# Patient Record
Sex: FEMALE | Race: WHITE | NOT HISPANIC OR LATINO | Employment: OTHER | URBAN - NONMETROPOLITAN AREA
[De-identification: names, ages, dates, MRNs, and addresses within clinical notes are randomized per-mention and may not be internally consistent; named-entity substitution may affect disease eponyms.]

---

## 2017-03-06 DIAGNOSIS — E07.9 THYROID DISEASE: ICD-10-CM

## 2017-03-06 RX ORDER — LEVOTHYROXINE SODIUM 112 MCG
112 TABLET ORAL DAILY
Qty: 90 TABLET | Refills: 0 | Status: SHIPPED | OUTPATIENT
Start: 2017-03-06

## 2017-03-06 NOTE — TELEPHONE ENCOUNTER
Synthroid     Last Written Prescription Date: 3/3/16  Last Quantity: 90, # refills: 3  Last Office Visit with G, [facility] or [facility] prescribing provider: 8/29/16   Next 5 appointments (look out 90 days)     Mar 15, 2017 11:00 AM CDT   (Arrive by 10:45 AM)   SHORT with Jia Pina, NP   [facility] ([facility] )    [address]  [facility] [address]   [phone]                   TSH   Date Value Ref Range Status   08/29/2016 1.68 0.40 - 4.00 mU/L Final

## 2017-03-16 ENCOUNTER — HOSPITAL ENCOUNTER (EMERGENCY)
Facility: HOSPITAL | Age: 72
Discharge: HOME | End: 2017-03-16
Attending: FAMILY MEDICINE | Admitting: FAMILY MEDICINE
Payer: MEDICARE

## 2017-03-16 VITALS
OXYGEN SATURATION: 96 % | TEMPERATURE: 99.1 F | RESPIRATION RATE: 18 BRPM | HEART RATE: 84 BPM | DIASTOLIC BLOOD PRESSURE: 82 MMHG | SYSTOLIC BLOOD PRESSURE: 163 MMHG
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
## 2017-03-16 PROCEDURE — 99282 EMERGENCY DEPT VISIT SF MDM: CPT

## 2017-03-16 PROCEDURE — 99283 EMERGENCY DEPT VISIT LOW MDM: CPT | Performed by: FAMILY MEDICINE

## 2017-03-16 NOTE — DISCHARGE INSTRUCTIONS
Preventing Falls: How to Prepare and What to Do  Falling is not something you want to think about. But it can make a big difference to plan ahead. If you're prepared, you'll know how to get help. And you'll be less likely to panic if you fall. This means you'll be able to do what's needed to get help right away.    How to prepare    Have someone check on you daily.    Keep a list of emergency numbers near the phone.    Always have a way to call for help. Keep a cell phone with you at all times. Or talk with your healthcare provider about how to set up a home monitoring service. This involves wearing a small device around your neck or wrist. If you fall, you can press the button on the device. This alerts emergency responders.    Talk with your healthcare provider about an exercise program that's right for you. Regular exercise may reduce the risk of falling and the risk for injury related to a fall.    It's important to have good lighting in your home. Avoid using throw rugs, because they can raise your risk of tripping and falling. Add grab bars in the bathroom to help reduce the risk of falling. Small changes can make your home safer. Talk with your healthcare provider about making your home safer.  What to do if you fall  Above all, try to stay calm:    If you start to fall, try to relax your body. This will reduce the impact of the fall.    After you fall, press your monitor button, or phone for help.    Don't rush to get up. First, make sure you're not hurt.    Roll onto your side, then crawl to a chair. Pull yourself up onto the chair slowly.    You should be checked if you struck your head, lost consciousness, were confused afterward, or have any other concerns for injury.    Be sure to tell your doctor that you fell.  A note to family and friends  If you're with a loved one when he or she starts to fall, don't try to stop the fall. Ease the person to the floor carefully, so neither of you gets hurt. Don't  leave the person alone. And don't try to move him or her. Put a pillow under his or her head. Check for injuries. If help is needed right away, be sure to call 911.     2904-0840 [employer]. [address] [place] [address], [place] [address]. All rights reserved. This information is not intended as a substitute for professional medical care. Always follow your healthcare professional's instructions.

## 2017-03-16 NOTE — ED AVS SNAPSHOT
[facility]    [address]    [place] [address]    Phone:  [phone]                                       Na Vogel   MRN: [id number]    Department:  [facility]   Date of Visit:  3/16/2017           Patient Information     Date Of Birth          1945        Your diagnoses for this visit were:     Fall, initial encounter        You were seen by Lizy Dasilva MD.        Discharge Instructions         Preventing Falls: How to Prepare and What to Do  Falling is not something you want to think about. But it can make a big difference to plan ahead. If you're prepared, you'll know how to get help. And you'll be less likely to panic if you fall. This means you'll be able to do what's needed to get help right away.    How to prepare    Have someone check on you daily.    Keep a list of emergency numbers near the phone.    Always have a way to call for help. Keep a cell phone with you at all times. Or talk with your healthcare provider about how to set up a home monitoring service. This involves wearing a small device around your neck or wrist. If you fall, you can press the button on the device. This alerts emergency responders.    Talk with your healthcare provider about an exercise program that's right for you. Regular exercise may reduce the risk of falling and the risk for injury related to a fall.    It's important to have good lighting in your home. Avoid using throw rugs, because they can raise your risk of tripping and falling. Add grab bars in the bathroom to help reduce the risk of falling. Small changes can make your home safer. Talk with your healthcare provider about making your home safer.  What to do if you fall  Above all, try to stay calm:    If you start to fall, try to relax your body. This will reduce the impact of the fall.    After you fall, press your monitor button, or phone for help.    Don't rush to get up. First, make sure you're not  hurt.    Roll onto your side, then crawl to a chair. Pull yourself up onto the chair slowly.    You should be checked if you struck your head, lost consciousness, were confused afterward, or have any other concerns for injury.    Be sure to tell your doctor that you fell.  A note to family and friends  If you're with a loved one when he or she starts to fall, don't try to stop the fall. Ease the person to the floor carefully, so neither of you gets hurt. Don't leave the person alone. And don't try to move him or her. Put a pillow under his or her head. Check for injuries. If help is needed right away, be sure to call 911.     5436-5245 [employer]. [address] [place], [place] [address]. All rights reserved. This information is not intended as a substitute for professional medical care. Always follow your healthcare professional's instructions.             Review of your medicines      Our records show that you are taking the medicines listed below. If these are incorrect, please call your family doctor or clinic.        Dose / Directions Last dose taken    albuterol 108 (90 BASE) MCG/ACT Inhaler   Commonly known as:  albuterol   Quantity:  3 Inhaler        INHALE 2 PUFFS INTO THE LUNGS EVERY 6 HOURS   Refills:  3        budesonide-formoterol 160-4.5 MCG/ACT Inhaler   Commonly known as:  SYMBICORT   Dose:  2 puff   Quantity:  3 Inhaler        Inhale 2 puffs into the lungs 2 times daily   Refills:  3        cetirizine 10 MG tablet   Commonly known as:  zyrTEC   Dose:  10 mg   Quantity:  90 tablet        Take 1 tablet (10 mg) by mouth every evening   Refills:  3        citalopram 20 MG tablet   Commonly known as:  celeXA   Quantity:  90 tablet        TAKE 1 TABLET BY MOUTH EVERY DAY   Refills:  0        esomeprazole 40 MG CR capsule   Commonly known as:  nexIUM   Quantity:  90 capsule        TAKE ONE CAPSULE BY MOUTH EVERY DAY   Refills:  0        fluticasone 50 MCG/ACT spray   Commonly known as:   "FLONASE   Dose:  1-2 spray   Quantity:  16 g        Spray 1-2 sprays into both nostrils daily   Refills:  1        ipratropium - albuterol 0.5 mg/2.5 mg/3 mL 0.5-2.5 (3) MG/3ML neb solution   Commonly known as:  DUONEB   Quantity:  1080 mL        NEBULIZE 1 VIAL EVERY 6 HOURS AS NEEDED FOR SHORTNESS OF BREATH, DYSPNEA, OR WHEEZING   Refills:  5        montelukast 10 MG tablet   Commonly known as:  SINGULAIR   Quantity:  90 tablet        TAKE 1 TABLET BY MOUTH EVERY NIGHT AT BEDTIME   Refills:  1        order for DME   Quantity:  1 Device        Equipment being ordered: Nebulizer   Refills:  0        SYNTHROID 112 MCG tablet   Dose:  112 mcg   Quantity:  90 tablet   Generic drug:  levothyroxine        Take 1 tablet (112 mcg) by mouth daily   Refills:  0                Orders Needing Specimen Collection     None      Pending Results     No orders found from 3/14/2017 to 3/17/2017.            Pending Culture Results     No orders found from 3/14/2017 to 3/17/2017.            Thank you for choosing [facility]       Thank you for choosing [facility] for your care. Our goal is always to provide you with excellent care. Hearing back from our patients is one way we can continue to improve our services. Please take a few minutes to complete the written survey that you may receive in the mail after you visit with us. Thank you!        [employer] Information     [employer] lets you send messages to your doctor, view your test results, renew your prescriptions, schedule appointments and more. To sign up, go to [email or web address] . Click on \"Log in\" on the left side of the screen, which will take you to the Welcome page. Then click on \"Sign up Now\" on the right side of the page.     You will be asked to enter the access code listed below, as well as some personal information. Please follow the directions to create your username and password.     Your access code is: [email or web address]  Expires: 6/14/2017  5:50 PM     Your access code will "  in 90 days. If you need help or a new code, please call your [place] clinic or [phone].        Care EveryWhere ID     This is your Care EveryWhere ID. This could be used by other organizations to access your [place] medical records  [id number]        After Visit Summary       This is your record. Keep this with you and show to your community pharmacist(s) and doctor(s) at your next visit.

## 2017-03-16 NOTE — ED AVS SNAPSHOT
[facility]    [address] [place] [address]    Phone:  [phone]                                       Na Vogel   MRN: [id number]    Department:  [facility]   Date of Visit:  3/16/2017           After Visit Summary Signature Page     I have received my discharge instructions, and my questions have been answered. I have discussed any challenges I see with this plan with the nurse or doctor.    ..........................................................................................................................................  Patient/Patient Representative Signature      ..........................................................................................................................................  Patient Representative Print Name and Relationship to Patient    ..................................................               ................................................  Date                                            Time    ..........................................................................................................................................  Reviewed by Signature/Title    ...................................................              ..............................................  Date                                                            Time

## 2017-03-16 NOTE — ED NOTES
"Pt stated she fell (I tripped over a drawer I was cleaning out) yesterday in the Kitchen around 1430 \"and hit my head on the cupboard and landed on my left side\". Pt states no LOC, \"I had a bump on my head but I put ice on it\". Pt c/o right neck pain \"more sore today\".    Pt stated she went to [facility] today because she was having ear, neck and throat pain. Stated they checked her out for strep and flu and those results were negative and was told she had a sinus infection and was given \"a shot for that\" at the clinic.    Stated \"I forgot to tell them about my fall\". Pt stated she woke up today feeling sick. Her kids are worried she has a concussion and that is why she feels ill. \"I wouldn't have come to ER but they insisted I come and have my head checked\".  "

## 2017-03-18 NOTE — ED PROVIDER NOTES
"eMERGENCY dEPARTMENT eNCOUnter        CHIEF COMPLAINT    Chief Complaint   Patient presents with     Head Injury       HPI    Na Vogel is a 71 year old female who presents to be evaluated for a concussion after she fell yesterday and bumped her head on her cupboard. She was cleaning under her oven, she'd left an adjacent drawer open, when she stood up she tripped over the drawer.  She did not lose consciousness. She \"laid there\" for a minute,   She got up, sat on her couch.  Then felt fine. That night she developed a runny nose and some sinus symptoms, she went into [facility] clinic this morning, was given antibiotic for sinus issues.  She states she was given a shot.  When she got home, her daughter called and was \"furious\" that she didn't get evaluated for a concussion.  She didn't have any LOC, no nausea, no vomiting. She is planning to drive to [place] tomorrow and wanted to get checked out.    REVIEW OF SYSTEMS    Neurologic: No extremity pain or weakenss  Cardiac: No Chest Pain or Chest Injury  Respiratory: No Difficulty breathing  GI: No Abdominal pain or Abdominal Injury  : No Dysuria or Hematuria  General: No Fever  All other systems reviewed and are negative.     PAST MEDICAL & SURGICAL HISTORY    Past Medical History   Diagnosis Date     Acute pharyngitis 02/24/2000     Acute sinusitis, unspecified 11/10/2000     Acute upper respiratory infections of unspecified 09/29/2005     Bronchitis, not specified as acute or chronic 01/07/2002     Cholecystitis, unspecified 03/06/2001     Dizziness and giddiness 10/29/2001     Dysuria 02/17/2005     Esophageal reflux 11/26/2002     Follow-up examination, following other surgery 08/01/2002     Follow-up examination, following unspecified surge 03/22/2001     Gastro-oesophageal reflux disease      Giant cell arteritis (H) 06/28/2000     Hiatal hernia      Midline low back pain without sciatica 8/29/2016     Mild persistent asthma 1/13/2014 "     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     Myalgia and myositis, unspecified 06/28//2000     Need for prophylactic vaccination and inoculation,Need for prophylactic vaccination and inoculation, 10/22/2004     Osteoarthrosis, unspecified whether generalized or 06/18/2002     Other abnormal findings on radiological examinatio 04/19/2002     Other and unspecified hyperlipidemia 01/23/2001     Other malaise and fatigue 11/12/2004     Other screening mammogram 11/27/2002     Other specified pre-operative examination 07/20/2005     Pain in joint involving lower leg 06/07/2002     Pain in joint, site unspecified 03/02/2000     Pure hypercholesterolemia 07/22/2003     Rosacea 8/29/2013     Routine general medical examination at [facility] 04/15/2002     Thyroid disease      Unspecified acquired hypothyroidism 11/22/2002     Unspecified hypertrophic and atrophic conditions o 11/09/1999     Unspecified otitis media 09/25/2007     Unspecified sinusitis (chronic) 11/04/1999     Past Surgical History   Procedure Laterality Date     Orthopedic surgery       Gyn surgery       Cholecystectomy       Appendectomy       Ligate vein varicose, phlebectomy multiple stab, combined  4/16/2014     Procedure: BILATERAL ANTERIOR AND POSTERIOR STAB PHLEBECTOMY, EXCISION SOFT TISSUE MASS RIGHT LOWER BUTTOCK;  Surgeon: Lara Galeano DO;  Location: [facility]     Colonoscopy  07-     Repeat 10 years     Colonoscopy  2012       CURRENT MEDICATIONS    Current Outpatient Rx   Medication Sig Dispense Refill     SYNTHROID 112 MCG tablet Take 1 tablet (112 mcg) by mouth daily 90 tablet 0     citalopram (CELEXA) 20 MG tablet TAKE 1 TABLET BY MOUTH EVERY DAY 90 tablet 0     esomeprazole (NEXIUM) 40 MG CR capsule TAKE ONE CAPSULE BY MOUTH EVERY DAY 90 capsule 0     budesonide-formoterol (SYMBICORT) 160-4.5 MCG/ACT inhaler Inhale 2 puffs into the lungs 2 times daily 3 Inhaler 3     cetirizine (ZYRTEC) 10 MG tablet Take 1 tablet (10 mg) by  mouth every evening 90 tablet 3     montelukast (SINGULAIR) 10 MG tablet TAKE 1 TABLET BY MOUTH EVERY NIGHT AT BEDTIME 90 tablet 1     albuterol (ALBUTEROL) 108 (90 BASE) MCG/ACT inhaler INHALE 2 PUFFS INTO THE LUNGS EVERY 6 HOURS 3 Inhaler 3     ipratropium - albuterol 0.5 mg/2.5 mg/3 mL (DUONEB) 0.5-2.5 (3) MG/3ML nebulization NEBULIZE 1 VIAL EVERY 6 HOURS AS NEEDED FOR SHORTNESS OF BREATH, DYSPNEA, OR WHEEZING 1080 mL 5     fluticasone (FLONASE) 50 MCG/ACT nasal spray Spray 1-2 sprays into both nostrils daily 16 g 1     ORDER FOR DME Equipment being ordered: Nebulizer 1 Device 0       ALLERGIES    Allergies   Allergen Reactions     Aspirin Other (See Comments)     abd pain  cramps     Moxifloxacin Hcl [Avelox] Hives     Ibuprofen Sodium Rash       SOCIAL & FAMILY HISTORY    Social History     Social History     Marital status:      Spouse name: N/A     Number of children: N/A     Years of education: N/A     Social History Main Topics     Smoking status: Never Smoker     Smokeless tobacco: Never Used     Alcohol use No     Drug use: No     Sexual activity: Not on file     Other Topics Concern     Parent/Sibling W/ Cabg, Mi Or Angioplasty Before 65f 55m? No     Social History Narrative     Family History   Problem Relation Age of Onset     Thyroid Disease Mother      Prostate Cancer Father      CANCER Father      cancer of bone       PHYSICAL EXAM    VITAL SIGNS: /82  Pulse 84  Temp 99.1  F (37.3  C) (Oral)  Resp 18  SpO2 96%   Constitutional:  Well developed, well nourished, no acute distress , she is alert and pleasant  Scalp: no bruising, no lacerations, no swelling  Neck: no posterior midline neck tenderness, no JVD   Eyes: Pupils equally round and react to light, extraocular movement are intact  HENT:  No trismus, airway patent  Respiratory:  Lungs Clear, no retractions   Cardiovascular:  Reg rate, no murmurs  GI:  Soft, nontender, normal bowel sounds  Musculoskeletal:  No edema, no acute  deformities  Integument:  Well hydrated, no petechiae   Neurologic:  Alert & oriented, no slurred speech, normal gross motor, normal finger to nose test bilaterally  Psych: Pleasant affect, no hallucinations          ED COURSE & MEDICAL DECISION MAKING    Pertinent Labs & Imaging studies reviewed and interpreted. (See chart for details)    Vitals:    03/16/17 1702 03/16/17 1758   BP: 156/80 163/82   Pulse: 91 84   Resp: 20 18   Temp: 96.9  F (36.1  C) 99.1  F (37.3  C)   TempSrc: Tympanic Oral   SpO2: 98% 96%         FINAL IMPRESSION    Scalp contusion    Plan:  There is no indication for imaging, I she has no symptoms of concussion.  Continue with her medications that were prescribed earlier today.  Symptomatic care.         Lizy Dasilva MD  03/18/17 [id number]

## 2017-03-19 ENCOUNTER — HOSPITAL ENCOUNTER (EMERGENCY)
Facility: HOSPITAL | Age: 72
Discharge: HOME | End: 2017-03-19
Attending: PHYSICIAN ASSISTANT | Admitting: PHYSICIAN ASSISTANT
Payer: MEDICARE

## 2017-03-19 VITALS
SYSTOLIC BLOOD PRESSURE: 154 MMHG | TEMPERATURE: 98.1 F | DIASTOLIC BLOOD PRESSURE: 89 MMHG | OXYGEN SATURATION: 98 % | HEART RATE: 82 BPM | RESPIRATION RATE: 12 BRPM

## 2017-03-19 DIAGNOSIS — J06.9 VIRAL URI: ICD-10-CM

## 2017-03-19 DIAGNOSIS — J44.1 COPD EXACERBATION (H): ICD-10-CM

## 2017-03-19 LAB
ALBUMIN SERPL-MCNC: 3.5 G/DL (ref 3.4–5)
ALP SERPL-CCNC: 76 U/L (ref 40–150)
ALT SERPL W P-5'-P-CCNC: 22 U/L (ref 0–50)
ANION GAP SERPL CALCULATED.3IONS-SCNC: 9 MMOL/L (ref 3–14)
AST SERPL W P-5'-P-CCNC: 13 U/L (ref 0–45)
BASOPHILS # BLD AUTO: 0 10E9/L (ref 0–0.2)
BASOPHILS NFR BLD AUTO: 0.4 %
BILIRUB SERPL-MCNC: 0.3 MG/DL (ref 0.2–1.3)
BUN SERPL-MCNC: 13 MG/DL (ref 7–30)
CALCIUM SERPL-MCNC: 9.2 MG/DL (ref 8.5–10.1)
CHLORIDE SERPL-SCNC: 104 MMOL/L (ref 94–109)
CO2 SERPL-SCNC: 27 MMOL/L (ref 20–32)
CREAT SERPL-MCNC: 0.72 MG/DL (ref 0.52–1.04)
DEPRECATED S PYO AG THROAT QL EIA: NORMAL
DIFFERENTIAL METHOD BLD: NORMAL
EOSINOPHIL # BLD AUTO: 0.4 10E9/L (ref 0–0.7)
EOSINOPHIL NFR BLD AUTO: 3.5 %
ERYTHROCYTE [DISTWIDTH] IN BLOOD BY AUTOMATED COUNT: 12 % (ref 10–15)
FLUAV+FLUBV AG SPEC QL: NEGATIVE
FLUAV+FLUBV AG SPEC QL: NORMAL
GFR SERPL CREATININE-BSD FRML MDRD: 80 ML/MIN/1.7M2
GLUCOSE SERPL-MCNC: 95 MG/DL (ref 70–99)
HCT VFR BLD AUTO: 40.9 % (ref 35–47)
HGB BLD-MCNC: 14 G/DL (ref 11.7–15.7)
IMM GRANULOCYTES # BLD: 0 10E9/L (ref 0–0.4)
IMM GRANULOCYTES NFR BLD: 0.4 %
LYMPHOCYTES # BLD AUTO: 2.2 10E9/L (ref 0.8–5.3)
LYMPHOCYTES NFR BLD AUTO: 22.2 %
MCH RBC QN AUTO: 29.5 PG (ref 26.5–33)
MCHC RBC AUTO-ENTMCNC: 34.2 G/DL (ref 31.5–36.5)
MCV RBC AUTO: 86 FL (ref 78–100)
MICRO REPORT STATUS: NORMAL
MONOCYTES # BLD AUTO: 0.7 10E9/L (ref 0–1.3)
MONOCYTES NFR BLD AUTO: 6.7 %
NEUTROPHILS # BLD AUTO: 6.7 10E9/L (ref 1.6–8.3)
NEUTROPHILS NFR BLD AUTO: 66.8 %
NRBC # BLD AUTO: 0 10*3/UL
NRBC BLD AUTO-RTO: 0 /100
PLATELET # BLD AUTO: 278 10E9/L (ref 150–450)
POTASSIUM SERPL-SCNC: 4.2 MMOL/L (ref 3.4–5.3)
PROT SERPL-MCNC: 7.5 G/DL (ref 6.8–8.8)
RBC # BLD AUTO: 4.75 10E12/L (ref 3.8–5.2)
SODIUM SERPL-SCNC: 140 MMOL/L (ref 133–144)
SPECIMEN SOURCE: NORMAL
SPECIMEN SOURCE: NORMAL
WBC # BLD AUTO: 10 10E9/L (ref 4–11)

## 2017-03-19 PROCEDURE — 87880 STREP A ASSAY W/OPTIC: CPT | Performed by: PHYSICIAN ASSISTANT

## 2017-03-19 PROCEDURE — 87804 INFLUENZA ASSAY W/OPTIC: CPT | Mod: 59 | Performed by: PHYSICIAN ASSISTANT

## 2017-03-19 PROCEDURE — 85025 COMPLETE CBC W/AUTO DIFF WBC: CPT | Performed by: PHYSICIAN ASSISTANT

## 2017-03-19 PROCEDURE — 94640 AIRWAY INHALATION TREATMENT: CPT

## 2017-03-19 PROCEDURE — 87081 CULTURE SCREEN ONLY: CPT | Performed by: PHYSICIAN ASSISTANT

## 2017-03-19 PROCEDURE — 99284 EMERGENCY DEPT VISIT MOD MDM: CPT | Performed by: PHYSICIAN ASSISTANT

## 2017-03-19 PROCEDURE — 80053 COMPREHEN METABOLIC PANEL: CPT | Performed by: PHYSICIAN ASSISTANT

## 2017-03-19 PROCEDURE — 99284 EMERGENCY DEPT VISIT MOD MDM: CPT | Mod: 25

## 2017-03-19 PROCEDURE — 71020 ZZHC CHEST TWO VIEWS, FRONT/LAT: CPT | Mod: TC

## 2017-03-19 PROCEDURE — 25000125 ZZHC RX 250: Performed by: PHYSICIAN ASSISTANT

## 2017-03-19 PROCEDURE — 36415 COLL VENOUS BLD VENIPUNCTURE: CPT | Performed by: PHYSICIAN ASSISTANT

## 2017-03-19 RX ORDER — PREDNISONE 20 MG/1
40 TABLET ORAL ONCE
Status: COMPLETED | OUTPATIENT
Start: 2017-03-19 | End: 2017-03-19

## 2017-03-19 RX ORDER — PREDNISONE 20 MG/1
40 TABLET ORAL DAILY
Qty: 8 TABLET | Refills: 0 | Status: SHIPPED | OUTPATIENT
Start: 2017-03-19 | End: 2017-03-23

## 2017-03-19 RX ORDER — ALBUTEROL SULFATE 0.83 MG/ML
1 SOLUTION RESPIRATORY (INHALATION) EVERY 6 HOURS PRN
Qty: 75 ML | Refills: 0 | Status: SHIPPED | OUTPATIENT
Start: 2017-03-19 | End: 2017-10-08

## 2017-03-19 RX ORDER — IPRATROPIUM BROMIDE AND ALBUTEROL SULFATE 2.5; .5 MG/3ML; MG/3ML
3 SOLUTION RESPIRATORY (INHALATION) ONCE
Status: COMPLETED | OUTPATIENT
Start: 2017-03-19 | End: 2017-03-19

## 2017-03-19 RX ADMIN — PREDNISONE 40 MG: 20 TABLET ORAL at 16:26

## 2017-03-19 RX ADMIN — IPRATROPIUM BROMIDE AND ALBUTEROL SULFATE 3 ML: .5; 3 SOLUTION RESPIRATORY (INHALATION) at 16:20

## 2017-03-19 ASSESSMENT — ENCOUNTER SYMPTOMS
ABDOMINAL PAIN: 0
ACTIVITY CHANGE: 1
VOMITING: 0
EYES NEGATIVE: 1
NAUSEA: 1
SORE THROAT: 1
SHORTNESS OF BREATH: 0
FATIGUE: 1
APPETITE CHANGE: 1
MYALGIAS: 1
RHINORRHEA: 1
COUGH: 1
FEVER: 1
PALPITATIONS: 0

## 2017-03-19 NOTE — DISCHARGE INSTRUCTIONS
Na,   Your labs and chest xray look good. I think that your lungs are irritated from a virus so I would like you to start on some steroids for the next 5 days to decrease inflammation. I also think it is a good idea to use your nebs every 4-6 hours to open your airways. It would be a good idea to see Fercho Simons this week sometime for a follow up to make sure you are turning the corner with your illness.  Don't hesitate to return for ANY worsening of symptoms or shortness of breath or high fever.

## 2017-03-19 NOTE — ED PROVIDER NOTES
History     Chief Complaint   Patient presents with     Cough     X 4 days, now productive. Seen in  Thursday     The history is provided by the patient.     Na Vogel is a 71 year old female who was seen at [facility] on Thursday for flu like symptoms. The patient states that she was tested for strep and the flu and that both were negative. The patient states that her cough has been productive over the past few days, with yellow sputum. The patient states she got a antibiotic shot on Thursday and has not had any improvement since that time. She has had a low grade temp of 99. She has a sore throat and decreased appetite.   Patient has a history of COPD.    I have reviewed the Medications, Allergies, Past Medical and Surgical History, and Social History in the Epic system.    Review of Systems   Constitutional: Positive for activity change, appetite change, fatigue and fever.   HENT: Positive for congestion, ear pain, rhinorrhea and sore throat.    Eyes: Negative.    Respiratory: Positive for cough. Negative for shortness of breath.    Cardiovascular: Negative for chest pain and palpitations.   Gastrointestinal: Positive for nausea. Negative for abdominal pain and vomiting.   Genitourinary: Negative.    Musculoskeletal: Positive for myalgias.       Physical Exam   BP: 144/83  Pulse: 82  Temp: 97.8  F (36.6  C)  Resp: 12  SpO2: 95 %  Physical Exam   Constitutional: Vital signs are normal. She is cooperative.   HENT:   Head: Normocephalic and atraumatic.   Right Ear: Tympanic membrane is not erythematous. A middle ear effusion is present.   Left Ear: Tympanic membrane is not erythematous. A middle ear effusion is present.   Nose: Rhinorrhea present.   Mouth/Throat: Uvula is midline.   Eyes: Conjunctivae and EOM are normal.   Cardiovascular: Normal rate, regular rhythm, S1 normal, S2 normal and normal heart sounds.    Pulmonary/Chest: Effort normal. No respiratory distress. She has decreased  breath sounds in the right lower field and the left lower field.   Abdominal: Soft. Normal appearance and bowel sounds are normal.   Neurological: She is alert.   Skin: Skin is warm and dry.   Psychiatric: She has a normal mood and affect. Her speech is normal and behavior is normal.       ED Course     ED Course     Procedures                 Labs Ordered and Resulted from Time of ED Arrival Up to the Time of Departure from the ED   CBC WITH PLATELETS DIFFERENTIAL   COMPREHENSIVE METABOLIC PANEL   RAPID STREP SCREEN   INFLUENZA A/B ANTIGEN   BETA STREP GROUP A CULTURE       Assessments & Plan (with Medical Decision Making)   The patient remained stable throughout her ED course. No respiratory distress. VS are WNL. She had labs that were unremarkable and a chest xray without evidence for infiltrate. I suspect this is a viral URI which has exacerbated the patients COPD. sats were greater than 95% and respiratory rate was normal. The patient was given a neb and a 40 mg of prednisone prior to being discharged. Suggested to the patient that she should do her nebs and home, continue with Robitussin for cough, tylenol/motrin for pain, and then continue with 4 more days of prednisone. Would like her to see her PCP Wednesday/Thursday.     I agree with the information in this note. I also saw/examined pt.  KIERSTEN Cheek PA-C    I have reviewed the nursing notes.    I have reviewed the findings, diagnosis, plan and need for follow up with the patient.    Current Discharge Medication List          Final diagnoses:   COPD exacerbation (H)   Viral URI     Sabi Escobar NP student  3/19/2017   [facility]     Shy Cheek PA-C  03/19/17 3855

## 2017-03-19 NOTE — ED NOTES
Patient discharged home at this time. Given written and verbal discharge instructions regarding home care, f/u and medications. Patient verbalized understanding of all discharge instructions.

## 2017-03-19 NOTE — ED AVS SNAPSHOT
[facility]    [address] [address]    Phone:  [phone]                                       Na Vogel   MRN: [id number]    Department:  [facility]   Date of Visit:  3/19/2017           After Visit Summary Signature Page     I have received my discharge instructions, and my questions have been answered. I have discussed any challenges I see with this plan with the nurse or doctor.    ..........................................................................................................................................  Patient/Patient Representative Signature      ..........................................................................................................................................  Patient Representative Print Name and Relationship to Patient    ..................................................               ................................................  Date                                            Time    ..........................................................................................................................................  Reviewed by Signature/Title    ...................................................              ..............................................  Date                                                            Time

## 2017-03-19 NOTE — ED NOTES
Patient seen in  Thursday. Negative for Strep and Influenza. Patient reports worsening cough with yellow sputum. She has a Hx of COPD and needing inhalers/neds more frequently.

## 2017-03-19 NOTE — ED AVS SNAPSHOT
[facility]    [address] [place] [address]    [place] [address]    Phone:  [phone]                                       Na Vogel   MRN: [id number]    Department:  [facility]   Date of Visit:  3/19/2017           Patient Information     Date Of Birth          1945        Your diagnoses for this visit were:     COPD exacerbation (H)     Viral URI        You were seen by Shy Cheek PA-C.      Follow-up Information     Follow up with Jia Pina NP In 1 week.    Specialty:  Family Practice    Contact information:    [facility]  [address] [place]  [place] [id number] [phone]          Discharge Instructions       Na,   Your labs and chest xray look good. I think that your lungs are irritated from a virus so I would like you to start on some steroids for the next 5 days to decrease inflammation. I also think it is a good idea to use your nebs every 4-6 hours to open your airways. It would be a good idea to see Fercho Smions this week sometime for a follow up to make sure you are turning the corner with your illness.  Don't hesitate to return for ANY worsening of symptoms or shortness of breath or high fever.     Discharge References/Attachments     URI, VIRAL, NO ABX (ADULT) (ENGLISH)         Review of your medicines      START taking        Dose / Directions Last dose taken    predniSONE 20 MG tablet   Commonly known as:  DELTASONE   Dose:  40 mg   Quantity:  8 tablet        Take 2 tablets (40 mg) by mouth daily for 4 days Take two tablets (= 40mg) each day for 5 (five) days   Refills:  0          Our records show that you are taking the medicines listed below. If these are incorrect, please call your family doctor or clinic.        Dose / Directions Last dose taken    budesonide-formoterol 160-4.5 MCG/ACT Inhaler   Commonly known as:  SYMBICORT   Dose:  2 puff   Quantity:  3 Inhaler        Inhale 2 puffs into the lungs 2 times daily    Refills:  3        cetirizine 10 MG tablet   Commonly known as:  zyrTEC   Dose:  10 mg   Quantity:  90 tablet        Take 1 tablet (10 mg) by mouth every evening   Refills:  3        citalopram 20 MG tablet   Commonly known as:  celeXA   Quantity:  90 tablet        TAKE 1 TABLET BY MOUTH EVERY DAY   Refills:  0        esomeprazole 40 MG CR capsule   Commonly known as:  nexIUM   Quantity:  90 capsule        TAKE ONE CAPSULE BY MOUTH EVERY DAY   Refills:  0        ipratropium - albuterol 0.5 mg/2.5 mg/3 mL 0.5-2.5 (3) MG/3ML neb solution   Commonly known as:  DUONEB   Quantity:  1080 mL        NEBULIZE 1 VIAL EVERY 6 HOURS AS NEEDED FOR SHORTNESS OF BREATH, DYSPNEA, OR WHEEZING   Refills:  5        montelukast 10 MG tablet   Commonly known as:  SINGULAIR   Quantity:  90 tablet        TAKE 1 TABLET BY MOUTH EVERY NIGHT AT BEDTIME   Refills:  1        order for DME   Quantity:  1 Device        Equipment being ordered: Nebulizer   Refills:  0        SYNTHROID 112 MCG tablet   Dose:  112 mcg   Quantity:  90 tablet   Generic drug:  levothyroxine        Take 1 tablet (112 mcg) by mouth daily   Refills:  0          ASK your doctor about these medications        Dose / Directions Last dose taken    * albuterol 108 (90 BASE) MCG/ACT Inhaler   Commonly known as:  albuterol   What changed:  Another medication with the same name was added. Make sure you understand how and when to take each.   Quantity:  3 Inhaler   Ask about: Which instructions should I use?        INHALE 2 PUFFS INTO THE LUNGS EVERY 6 HOURS   Refills:  3        * albuterol (2.5 MG/3ML) 0.083% neb solution   Dose:  1 vial   What changed:  You were already taking a medication with the same name, and this prescription was added. Make sure you understand how and when to take each.   Quantity:  75 mL   Ask about: Which instructions should I use?        Take 1 vial (2.5 mg) by nebulization every 6 hours as needed for shortness of breath / dyspnea or wheezing  "  Refills:  0        * Notice:  This list has 2 medication(s) that are the same as other medications prescribed for you. Read the directions carefully, and ask your doctor or other care provider to review them with you.            Prescriptions were sent or printed at these locations (2 Prescriptions)                   [facility] [address] [place] [address] - [address] [place] [address]  AT [facility] [address]   [address] , [place] [address]    Telephone:  [phone]   Fax:  [phone]   Hours:                  E-Prescribed (2 of 2)         predniSONE (DELTASONE) 20 MG tablet               albuterol (2.5 MG/3ML) 0.083% neb solution                Procedures and tests performed during your visit     Beta strep group A culture    CBC with platelets differential    Chest XR,  PA & LAT    Comprehensive metabolic panel    Influenza A/B antigen    Rapid strep screen      Orders Needing Specimen Collection     None      Pending Results     Date and Time Order Name Status Description    3/19/2017 1535 Beta strep group A culture In process     3/19/2017 1529 Chest XR,  PA & LAT In process             Pending Culture Results     Date and Time Order Name Status Description    3/19/2017 1535 Beta strep group A culture In process             Thank you for choosing [facility]       Thank you for choosing [facility] for your care. Our goal is always to provide you with excellent care. Hearing back from our patients is one way we can continue to improve our services. Please take a few minutes to complete the written survey that you may receive in the mail after you visit with us. Thank you!        [employer] Information     [employer] lets you send messages to your doctor, view your test results, renew your prescriptions, schedule appointments and more. To sign up, go to [email or web address] . Click on \"Log in\" on the left side of the screen, which will take you to the Welcome page. Then click on \"Sign up Now\" on the " right side of the page.     You will be asked to enter the access code listed below, as well as some personal information. Please follow the directions to create your username and password.     Your access code is: [email or web address]  Expires: 2017  5:50 PM     Your access code will  in 90 days. If you need help or a new code, please call your [facility] clinic or [phone].        Care EveryWhere ID     This is your Care EveryWhere ID. This could be used by other organizations to access your [facility] medical records  [id number]        After Visit Summary       This is your record. Keep this with you and show to your community pharmacist(s) and doctor(s) at your next visit.

## 2017-03-21 LAB
BACTERIA SPEC CULT: NORMAL
MICRO REPORT STATUS: NORMAL
SPECIMEN SOURCE: NORMAL

## 2017-10-08 ENCOUNTER — APPOINTMENT (OUTPATIENT)
Dept: GENERAL RADIOLOGY | Facility: HOSPITAL | Age: 72
End: 2017-10-08
Attending: NURSE PRACTITIONER
Payer: MEDICARE

## 2017-10-08 ENCOUNTER — HOSPITAL ENCOUNTER (EMERGENCY)
Facility: HOSPITAL | Age: 72
Discharge: HOME | End: 2017-10-08
Attending: NURSE PRACTITIONER | Admitting: NURSE PRACTITIONER
Payer: MEDICARE

## 2017-10-08 VITALS
TEMPERATURE: 98.3 F | DIASTOLIC BLOOD PRESSURE: 73 MMHG | HEART RATE: 85 BPM | OXYGEN SATURATION: 99 % | SYSTOLIC BLOOD PRESSURE: 163 MMHG | RESPIRATION RATE: 20 BRPM

## 2017-10-08 DIAGNOSIS — J01.90 ACUTE SINUSITIS WITH SYMPTOMS GREATER THAN 10 DAYS: ICD-10-CM

## 2017-10-08 DIAGNOSIS — J44.1 COPD EXACERBATION (H): ICD-10-CM

## 2017-10-08 LAB
BASOPHILS # BLD AUTO: 0.1 10E9/L (ref 0–0.2)
BASOPHILS NFR BLD AUTO: 0.5 %
DEPRECATED S PYO AG THROAT QL EIA: NORMAL
DIFFERENTIAL METHOD BLD: NORMAL
EOSINOPHIL # BLD AUTO: 0.2 10E9/L (ref 0–0.7)
EOSINOPHIL NFR BLD AUTO: 1.9 %
ERYTHROCYTE [DISTWIDTH] IN BLOOD BY AUTOMATED COUNT: 12 % (ref 10–15)
HCT VFR BLD AUTO: 39.6 % (ref 35–47)
HGB BLD-MCNC: 13.5 G/DL (ref 11.7–15.7)
IMM GRANULOCYTES # BLD: 0 10E9/L (ref 0–0.4)
IMM GRANULOCYTES NFR BLD: 0.4 %
LYMPHOCYTES # BLD AUTO: 3.2 10E9/L (ref 0.8–5.3)
LYMPHOCYTES NFR BLD AUTO: 33.6 %
MCH RBC QN AUTO: 29.4 PG (ref 26.5–33)
MCHC RBC AUTO-ENTMCNC: 34.1 G/DL (ref 31.5–36.5)
MCV RBC AUTO: 86 FL (ref 78–100)
MONOCYTES # BLD AUTO: 0.7 10E9/L (ref 0–1.3)
MONOCYTES NFR BLD AUTO: 7.3 %
NEUTROPHILS # BLD AUTO: 5.4 10E9/L (ref 1.6–8.3)
NEUTROPHILS NFR BLD AUTO: 56.3 %
NRBC # BLD AUTO: 0 10*3/UL
NRBC BLD AUTO-RTO: 0 /100
PLATELET # BLD AUTO: 290 10E9/L (ref 150–450)
RBC # BLD AUTO: 4.59 10E12/L (ref 3.8–5.2)
SPECIMEN SOURCE: NORMAL
WBC # BLD AUTO: 9.6 10E9/L (ref 4–11)

## 2017-10-08 PROCEDURE — 99213 OFFICE O/P EST LOW 20 MIN: CPT | Performed by: NURSE PRACTITIONER

## 2017-10-08 PROCEDURE — 71020 XR CHEST 2 VW: CPT | Mod: TC

## 2017-10-08 PROCEDURE — 25000132 ZZH RX MED GY IP 250 OP 250 PS 637: Mod: GY | Performed by: NURSE PRACTITIONER

## 2017-10-08 PROCEDURE — 99214 OFFICE O/P EST MOD 30 MIN: CPT | Mod: 25

## 2017-10-08 PROCEDURE — 85025 COMPLETE CBC W/AUTO DIFF WBC: CPT | Performed by: NURSE PRACTITIONER

## 2017-10-08 PROCEDURE — 36415 COLL VENOUS BLD VENIPUNCTURE: CPT | Performed by: NURSE PRACTITIONER

## 2017-10-08 PROCEDURE — A9270 NON-COVERED ITEM OR SERVICE: HCPCS | Mod: GY | Performed by: NURSE PRACTITIONER

## 2017-10-08 PROCEDURE — 87081 CULTURE SCREEN ONLY: CPT | Performed by: NURSE PRACTITIONER

## 2017-10-08 PROCEDURE — 87880 STREP A ASSAY W/OPTIC: CPT | Performed by: NURSE PRACTITIONER

## 2017-10-08 RX ORDER — PREDNISONE 20 MG/1
TABLET ORAL
Qty: 10 TABLET | Refills: 0 | Status: SHIPPED | OUTPATIENT
Start: 2017-10-08 | End: 2017-10-28

## 2017-10-08 RX ADMIN — AMOXICILLIN AND CLAVULANATE POTASSIUM 1 TABLET: 875; 125 TABLET, FILM COATED ORAL at 21:16

## 2017-10-08 ASSESSMENT — ENCOUNTER SYMPTOMS
FATIGUE: 1
COUGH: 1

## 2017-10-08 NOTE — ED AVS SNAPSHOT
[facility]    [address]    [facility] [address]    Phone:  [phone]                                       Na Vogel   MRN: [id number]    Department:  [facility]   Date of Visit:  10/8/2017           Patient Information     Date Of Birth          1945        Your diagnoses for this visit were:     Acute sinusitis with symptoms greater than 10 days     COPD exacerbation (H)        You were seen by Lynn Crook NP.      Follow-up Information     Follow up with Scot Thomas In 3 days.    Specialty:  Family Practice    Why:  for re-evaluation    Contact information:    [facility]  [address] [place] [address] [phone]          Follow up with [facility].    Specialty:  EMERGENCY MEDICINE    Why:  If symptoms worsen    Contact information:    [address]  [facility] [address] [phone]    Additional information:    From [place]: Take [address]. Turn left at [address]. Turn left at the first stoplight on [address]. At the first stop sign, take a right onto [address]. Take a left into the parking lot and continue through until you reach the North enterance of the building.       From [place]: Take [address]. Take the [address] ramp towards [place]. Turn left onto [address]. Take a slight right onto [address]. Turn left at the first stoplight on [address]. At the first stop sign, take a right onto [address]. Take a left into the parking lot and continue through until you reach the North enterance of the building.       From [place]: Take [address]. Take a right at [address]. At the first stop sign, take a right onto [address]. Take a left into the parking lot and continue through until you reach the North enterance of the building.         Discharge Instructions       Rest, drink plenty of  fluids.   Take medications as ordered.   Call for an appointment to see PCP in 3 days for re-evaluation.   Return here if symptoms worsen.       Sinusitis (Antibiotic Treatment)    The sinuses are air-filled spaces within the bones of the face. They connect to the inside of the nose. Sinusitis is an inflammation of the tissue lining the sinus cavity. Sinus inflammation can occur during a cold. It can also be due to allergies to pollens and other particles in the air. Sinusitis can cause symptoms of sinus congestion and fullness. A sinus infection causes fever, headache and facial pain. There is often green or yellow drainage from the nose or into the back of the throat (post-nasal drip). You have been given antibiotics to treat this condition.  Home care:    Take the full course of antibiotics as instructed. Do not stop taking them, even if you feel better.    Drink plenty of water, hot tea, and other liquids. This may help thin mucus. It also may promote sinus drainage.    Heat may help soothe painful areas of the face. Use a towel soaked in hot water. Or,  the shower and direct the hot spray onto your face. Using a vaporizer along with a menthol rub at night may also help.     An expectorant containing guaifenesin may help thin the mucus and promote drainage from the sinuses.    Over-the-counter decongestants may be used unless a similar medicine was prescribed. Nasal sprays work the fastest. Use one that contains phenylephrine or oxymetazoline. First blow the nose gently. Then use the spray. Do not use these medicines more often than directed on the label or symptoms may get worse. You may also use tablets containing pseudoephedrine. Avoid products that combine ingredients, because side effects may be increased. Read labels. You can also ask the pharmacist for help. (NOTE: Persons with high blood pressure should not use decongestants. They can raise blood pressure.)    Over-the-counter antihistamines may  help if allergies contributed to your sinusitis.      Do not use nasal rinses or irrigation during an acute sinus infection, unless told to by your health care provider. Rinsing may spread the infection to other sinuses.    Use acetaminophen or ibuprofen to control pain, unless another pain medicine was prescribed. (If you have chronic liver or kidney disease or ever had a stomach ulcer, talk with your doctor before using these medicines. Aspirin should never be used in anyone under 18 years of age who is ill with a fever. It may cause severe liver damage.)    Don't smoke. This can worsen symptoms.  Follow-up care  Follow up with your healthcare provider or our staff if you are not improving within the next week.  When to seek medical advice  Call your healthcare provider if any of these occur:    Facial pain or headache becoming more severe    Stiff neck    Unusual drowsiness or confusion    Swelling of the forehead or eyelids    Vision problems, including blurred or double vision    Fever of 100.4 F (38 C) or higher, or as directed by your healthcare provider    Seizure    Breathing problems    Symptoms not resolving within 10 days  Date Last Reviewed: 4/13/2015 2000-2017 [employer]. [address] [place] [address]. All rights reserved. This information is not intended as a substitute for professional medical care. Always follow your healthcare professional's instructions.          COPD Flare    You have had a flare-up of your COPD.  COPD, or chronic obstructive pulmonary disease, is a common lung disease. It causes your airways to become irritated and narrower. This makes it harder for you to breathe. Emphysema and chronic bronchitis are both types of COPD. This is a chronic condition, which means you always have it. Sometimes it gets worse. When this happens, it is called a flare-up.  Symptoms of COPD  People with COPD may have symptoms most of the time. In a flare-up, your symptoms  get worse. These symptoms may mean you are having a flare-up:    Shortness of breath, shallow or rapid breathing, or wheezing that gets worse    Lung infection    Cough that gets worse    More mucus, thicker mucus or mucus of a different color    Tiredness, decreased energy, or trouble doing your usual activities    Fever    Chest tightness    Your symptoms don t get better even when you use your usual medicines, inhalers, and nebulizer    Trouble talking    You feel confused  Causes of flare-ups  Unfortunately, a flare-up can happen even though you did everything right, and you followed your doctor s instructions. Some causes of flare-ups are:    Smoking or secondhand smoke    Colds, the flu, or respiratory infections    Air pollution    Sudden change in the weather    Dust, irritating chemicals, or strong fumes    Not taking your medicines as prescribed  Home care  Here are some things you can do at home to treat a flare-up:    Try not to panic. This makes it harder to breathe, and keeps you from doing the right things.    Don t smoke or be around others who are smoking.    Try to drink more fluids than usual during a flare-up, unless your doctor has told you not to because of heart and kidney problems. More fluids can help loosen the mucus.    Use your inhalers and nebulizer, if you have one, as you have been told to.    If you were given antibiotics, take them until they are used up or your doctor tells you to stop. It s important to finish the antibiotics, even though you feel better. This will make sure the infection has cleared.    If you were given prednisone or another steroid, finish it even if you feel better.  Preventing a flare-up  Even though flare-ups happen, the best way to treat one is to prevent it before it starts. Here are some pointers:    Don t smoke or be around others who are smoking.    Take your medicines as you have been told.    Talk with your doctor about getting a flu shot every year.  Also find out if you need a pneumonia shot.    If there is a weather advisory warning to stay indoors, try to stay inside when possible.    Try to eat healthy and get plenty of sleep.    Try to avoid things that usually set you off, like dust, chemical fumes, hairsprays, or strong perfumes.  Follow-up care  Follow up with your healthcare provider, or as advised.  If a culture was done, you will be told if your treatment needs to be changed. You can call as directed for the results.  If X-rays were done, you will be notified of any new findings that may affect your care.  Call 911  Call 911 if any of these occur:    You have trouble breathing    You feel confused or it s difficult to wake you up    You faint or lose consciousness    You have a rapid heart rate    You have new pain in your chest, arm, shoulder, neck or upper back  When to seek medical advice  Call your healthcare provider right away if any of these occur:    Wheezing or shortness of breath gets worse    You need to use your inhalers more often than usual without relief    Fever of 100.4 F (38 C) or higher, or as directed by your healthcare provider    Coughing up lots of dark-colored or bloody mucus (sputum)    Chest pain with each breath    You do not start to get better within 24 hours    Swelling of your ankles gets worse    Dizziness or weakness  Date Last Reviewed: 9/1/2016 2000-2017 [employer]. [address] [place], [place] [address]. All rights reserved. This information is not intended as a substitute for professional medical care. Always follow your healthcare professional's instructions.             Review of your medicines      START taking        Dose / Directions Last dose taken    amoxicillin-clavulanate 875-125 MG per tablet   Commonly known as:  AUGMENTIN   Dose:  1 tablet   Quantity:  20 tablet        Take 1 tablet by mouth 2 times daily for 10 days   Refills:  0        predniSONE 20 MG tablet   Commonly known as:   DELTASONE   Quantity:  10 tablet        Take two tablets (= 40mg) each day for 5 (five) days   Refills:  0          Our records show that you are taking the medicines listed below. If these are incorrect, please call your family doctor or clinic.        Dose / Directions Last dose taken    albuterol 108 (90 BASE) MCG/ACT Inhaler   Commonly known as:  PROAIR HFA   Quantity:  3 Inhaler        INHALE 2 PUFFS INTO THE LUNGS EVERY 6 HOURS   Refills:  3        budesonide-formoterol 160-4.5 MCG/ACT Inhaler   Commonly known as:  SYMBICORT   Dose:  2 puff   Quantity:  3 Inhaler        Inhale 2 puffs into the lungs 2 times daily   Refills:  3        cetirizine 10 MG tablet   Commonly known as:  zyrTEC   Dose:  10 mg   Quantity:  90 tablet        Take 1 tablet (10 mg) by mouth every evening   Refills:  3        citalopram 20 MG tablet   Commonly known as:  celeXA   Quantity:  90 tablet        TAKE 1 TABLET BY MOUTH EVERY DAY   Refills:  0        esomeprazole 40 MG CR capsule   Commonly known as:  nexIUM   Quantity:  90 capsule        TAKE ONE CAPSULE BY MOUTH EVERY DAY   Refills:  0        ipratropium - albuterol 0.5 mg/2.5 mg/3 mL 0.5-2.5 (3) MG/3ML neb solution   Commonly known as:  DUONEB   Quantity:  1080 mL        NEBULIZE 1 VIAL EVERY 6 HOURS AS NEEDED FOR SHORTNESS OF BREATH, DYSPNEA, OR WHEEZING   Refills:  5        order for DME   Quantity:  1 Device        Equipment being ordered: Nebulizer   Refills:  0        SYNTHROID 112 MCG tablet   Dose:  112 mcg   Quantity:  90 tablet   Generic drug:  levothyroxine        Take 1 tablet (112 mcg) by mouth daily   Refills:  0                Prescriptions were sent or printed at these locations (2 Prescriptions)                   [facility] - [place] - [address] AT [facility] [address]   [address] [address]    Telephone:  [phone]   Fax:  [phone]   Hours:                  E-Prescribed (2 of 2)          "amoxicillin-clavulanate (AUGMENTIN) 875-125 MG per tablet               predniSONE (DELTASONE) 20 MG tablet                Procedures and tests performed during your visit     Beta strep group A culture    CBC with platelets differential    Chest XR,  PA & LAT    Rapid strep screen      Orders Needing Specimen Collection     None      Pending Results     Date and Time Order Name Status Description    10/8/2017 2043 Chest XR,  PA & LAT In process     10/8/2017 2034 Beta strep group A culture In process             Pending Culture Results     Date and Time Order Name Status Description    10/8/2017 2034 Beta strep group A culture In process             Thank you for choosing [facility]       Thank you for choosing [facility] for your care. Our goal is always to provide you with excellent care. Hearing back from our patients is one way we can continue to improve our services. Please take a few minutes to complete the written survey that you may receive in the mail after you visit with us. Thank you!        [employer] Information     [employer] lets you send messages to your doctor, view your test results, renew your prescriptions, schedule appointments and more. To sign up, go to [email or web address] . Click on \"Log in\" on the left side of the screen, which will take you to the Welcome page. Then click on \"Sign up Now\" on the right side of the page.     You will be asked to enter the access code listed below, as well as some personal information. Please follow the directions to create your username and password.     Your access code is: [email or web address]  Expires: 2018  9:14 PM     Your access code will  in 90 days. If you need help or a new code, please call your [facility] clinic or [phone].        Care EveryWhere ID     This is your Care EveryWhere ID. This could be used by other organizations to access your [facility] medical records  [id number]        Equal Access to Services     AUBREY WASHBURN AH: Alicja schwartz " narda Plasencia, sara spicer, andrei lewis, shaista nobles. So [facility] [phone].    ATENCIÓN: Si habla español, tiene a espinoza disposición servicios gratuitos de asistencia lingüística. Llame al [phone].    We comply with applicable federal civil rights laws and [place] laws. We do not discriminate on the basis of race, color, national origin, age, disability, sex, sexual orientation, or gender identity.            After Visit Summary       This is your record. Keep this with you and show to your community pharmacist(s) and doctor(s) at your next visit.

## 2017-10-08 NOTE — ED AVS SNAPSHOT
[facility]    [address] [place] [address]    Phone:  [phone]                                       Na Vogel   MRN: [id number]    Department[facility]  [facility]   Date of Visit:  10/8/2017           After Visit Summary Signature Page     I have received my discharge instructions, and my questions have been answered. I have discussed any challenges I see with this plan with the nurse or doctor.    ..........................................................................................................................................  Patient/Patient Representative Signature      ..........................................................................................................................................  Patient Representative Print Name and Relationship to Patient    ..................................................               ................................................  Date                                            Time    ..........................................................................................................................................  Reviewed by Signature/Title    ...................................................              ..............................................  Date                                                            Time

## 2017-10-09 NOTE — DISCHARGE INSTRUCTIONS
Rest, drink plenty of fluids.   Take medications as ordered.   Call for an appointment to see PCP in 3 days for re-evaluation.   Return here if symptoms worsen.       Sinusitis (Antibiotic Treatment)    The sinuses are air-filled spaces within the bones of the face. They connect to the inside of the nose. Sinusitis is an inflammation of the tissue lining the sinus cavity. Sinus inflammation can occur during a cold. It can also be due to allergies to pollens and other particles in the air. Sinusitis can cause symptoms of sinus congestion and fullness. A sinus infection causes fever, headache and facial pain. There is often green or yellow drainage from the nose or into the back of the throat (post-nasal drip). You have been given antibiotics to treat this condition.  Home care:    Take the full course of antibiotics as instructed. Do not stop taking them, even if you feel better.    Drink plenty of water, hot tea, and other liquids. This may help thin mucus. It also may promote sinus drainage.    Heat may help soothe painful areas of the face. Use a towel soaked in hot water. Or,  the shower and direct the hot spray onto your face. Using a vaporizer along with a menthol rub at night may also help.     An expectorant containing guaifenesin may help thin the mucus and promote drainage from the sinuses.    Over-the-counter decongestants may be used unless a similar medicine was prescribed. Nasal sprays work the fastest. Use one that contains phenylephrine or oxymetazoline. First blow the nose gently. Then use the spray. Do not use these medicines more often than directed on the label or symptoms may get worse. You may also use tablets containing pseudoephedrine. Avoid products that combine ingredients, because side effects may be increased. Read labels. You can also ask the pharmacist for help. (NOTE: Persons with high blood pressure should not use decongestants. They can raise blood  pressure.)    Over-the-counter antihistamines may help if allergies contributed to your sinusitis.      Do not use nasal rinses or irrigation during an acute sinus infection, unless told to by your health care provider. Rinsing may spread the infection to other sinuses.    Use acetaminophen or ibuprofen to control pain, unless another pain medicine was prescribed. (If you have chronic liver or kidney disease or ever had a stomach ulcer, talk with your doctor before using these medicines. Aspirin should never be used in anyone under 18 years of age who is ill with a fever. It may cause severe liver damage.)    Don't smoke. This can worsen symptoms.  Follow-up care  Follow up with your healthcare provider or our staff if you are not improving within the next week.  When to seek medical advice  Call your healthcare provider if any of these occur:    Facial pain or headache becoming more severe    Stiff neck    Unusual drowsiness or confusion    Swelling of the forehead or eyelids    Vision problems, including blurred or double vision    Fever of 100.4 F (38 C) or higher, or as directed by your healthcare provider    Seizure    Breathing problems    Symptoms not resolving within 10 days  Date Last Reviewed: 4/13/2015 2000-2017 [employer]. [address] [place] [address]. All rights reserved. This information is not intended as a substitute for professional medical care. Always follow your healthcare professional's instructions.          COPD Flare    You have had a flare-up of your COPD.  COPD, or chronic obstructive pulmonary disease, is a common lung disease. It causes your airways to become irritated and narrower. This makes it harder for you to breathe. Emphysema and chronic bronchitis are both types of COPD. This is a chronic condition, which means you always have it. Sometimes it gets worse. When this happens, it is called a flare-up.  Symptoms of COPD  People with COPD may have  symptoms most of the time. In a flare-up, your symptoms get worse. These symptoms may mean you are having a flare-up:    Shortness of breath, shallow or rapid breathing, or wheezing that gets worse    Lung infection    Cough that gets worse    More mucus, thicker mucus or mucus of a different color    Tiredness, decreased energy, or trouble doing your usual activities    Fever    Chest tightness    Your symptoms don t get better even when you use your usual medicines, inhalers, and nebulizer    Trouble talking    You feel confused  Causes of flare-ups  Unfortunately, a flare-up can happen even though you did everything right, and you followed your doctor s instructions. Some causes of flare-ups are:    Smoking or secondhand smoke    Colds, the flu, or respiratory infections    Air pollution    Sudden change in the weather    Dust, irritating chemicals, or strong fumes    Not taking your medicines as prescribed  Home care  Here are some things you can do at home to treat a flare-up:    Try not to panic. This makes it harder to breathe, and keeps you from doing the right things.    Don t smoke or be around others who are smoking.    Try to drink more fluids than usual during a flare-up, unless your doctor has told you not to because of heart and kidney problems. More fluids can help loosen the mucus.    Use your inhalers and nebulizer, if you have one, as you have been told to.    If you were given antibiotics, take them until they are used up or your doctor tells you to stop. It s important to finish the antibiotics, even though you feel better. This will make sure the infection has cleared.    If you were given prednisone or another steroid, finish it even if you feel better.  Preventing a flare-up  Even though flare-ups happen, the best way to treat one is to prevent it before it starts. Here are some pointers:    Don t smoke or be around others who are smoking.    Take your medicines as you have been told.    Talk  with your doctor about getting a flu shot every year. Also find out if you need a pneumonia shot.    If there is a weather advisory warning to stay indoors, try to stay inside when possible.    Try to eat healthy and get plenty of sleep.    Try to avoid things that usually set you off, like dust, chemical fumes, hairsprays, or strong perfumes.  Follow-up care  Follow up with your healthcare provider, or as advised.  If a culture was done, you will be told if your treatment needs to be changed. You can call as directed for the results.  If X-rays were done, you will be notified of any new findings that may affect your care.  Call 911  Call 911 if any of these occur:    You have trouble breathing    You feel confused or it s difficult to wake you up    You faint or lose consciousness    You have a rapid heart rate    You have new pain in your chest, arm, shoulder, neck or upper back  When to seek medical advice  Call your healthcare provider right away if any of these occur:    Wheezing or shortness of breath gets worse    You need to use your inhalers more often than usual without relief    Fever of 100.4 F (38 C) or higher, or as directed by your healthcare provider    Coughing up lots of dark-colored or bloody mucus (sputum)    Chest pain with each breath    You do not start to get better within 24 hours    Swelling of your ankles gets worse    Dizziness or weakness  Date Last Reviewed: 9/1/2016 2000-2017 [employer]. [address] [place] [address], [place] [address]. All rights reserved. This information is not intended as a substitute for professional medical care. Always follow your healthcare professional's instructions.

## 2017-10-09 NOTE — PROGRESS NOTES
"Chest x-ray report routed to EDELMIRA Pina NP. IMPRESSION: Hiatal hernia. Augmentin and prednisone prescribed, and pt advised to \"follow up in 3 days\"  during the Urgent Care visit 10/8/17."

## 2017-10-09 NOTE — ED NOTES
Patient presents with cough and tickle in throat x 2 weeks.  Patient also has sinus drainage  Down the back of throat.  Drainage is clear.

## 2017-10-09 NOTE — ED PROVIDER NOTES
History     Chief Complaint   Patient presents with     Cough     The history is provided by the patient. No  was used.     Na Vogel is a 71 year old female who presents with clear sinus drainage, cough and PND for 2 weeks. Has discomfort in her throat. No fever.   Normal appetite, drinking fluids. History of COPD. Is taking her prescribed medications. Using cough drops for symptoms.     I have reviewed the Medications, Allergies, Past Medical and Surgical History, and Social History in the Epic system.    Allergies:   Allergies   Allergen Reactions     Aspirin Other (See Comments)     abd pain  cramps     Moxifloxacin Hcl [Avelox] Hives     Ibuprofen Sodium Rash         No current facility-administered medications on file prior to encounter.   Current Outpatient Prescriptions on File Prior to Encounter:  SYNTHROID 112 MCG tablet Take 1 tablet (112 mcg) by mouth daily   citalopram (CELEXA) 20 MG tablet TAKE 1 TABLET BY MOUTH EVERY DAY   esomeprazole (NEXIUM) 40 MG CR capsule TAKE ONE CAPSULE BY MOUTH EVERY DAY   albuterol (ALBUTEROL) 108 (90 BASE) MCG/ACT inhaler INHALE 2 PUFFS INTO THE LUNGS EVERY 6 HOURS   budesonide-formoterol (SYMBICORT) 160-4.5 MCG/ACT inhaler Inhale 2 puffs into the lungs 2 times daily   ipratropium - albuterol 0.5 mg/2.5 mg/3 mL (DUONEB) 0.5-2.5 (3) MG/3ML nebulization NEBULIZE 1 VIAL EVERY 6 HOURS AS NEEDED FOR SHORTNESS OF BREATH, DYSPNEA, OR WHEEZING   ORDER FOR DME Equipment being ordered: Nebulizer   [DISCONTINUED] albuterol (2.5 MG/3ML) 0.083% neb solution Take 1 vial (2.5 mg) by nebulization every 6 hours as needed for shortness of breath / dyspnea or wheezing   cetirizine (ZYRTEC) 10 MG tablet Take 1 tablet (10 mg) by mouth every evening       Patient Active Problem List   Diagnosis     Acquired hypothyroidism     Gastroesophageal reflux disease     Hyperlipidemia LDL goal <100     Mild recurrent major depression (H)     Rosacea     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     Venous (peripheral) insufficiency     Symptomatic reticular veins     Mild persistent asthma     COPD (chronic obstructive pulmonary disease) (H)     Midline low back pain without sciatica       Past Surgical History:   Procedure Laterality Date     APPENDECTOMY       CHOLECYSTECTOMY       COLONOSCOPY  07-    Repeat 10 years     COLONOSCOPY  2012     GYN SURGERY       LIGATE VEIN VARICOSE, PHLEBECTOMY MULTIPLE STAB, COMBINED  4/16/2014    Procedure: BILATERAL ANTERIOR AND POSTERIOR STAB PHLEBECTOMY, EXCISION SOFT TISSUE MASS RIGHT LOWER BUTTOCK;  Surgeon: Lara Galeano DO;  Location: [facility]     ORTHOPEDIC SURGERY         Social History   Substance Use Topics     Smoking status: Never Smoker     Smokeless tobacco: Never Used     Alcohol use No       Most Recent Immunizations   Administered Date(s) Administered     Influenza (IIV3) 11/08/2006     Pneumococcal 23 valent 11/06/2012     TD (ADULT, 7+) 04/14/2003       BMI: Estimated body mass index is 29.04 kg/(m^2) as calculated from the following:    Height as of 8/29/16: 1.727 m (5' 8\").    Weight as of 8/29/16: 86.6 kg (191 lb).      Review of Systems   Constitutional: Positive for fatigue.   HENT: Positive for congestion.    Respiratory: Positive for cough.        Physical Exam   BP: 163/73  Pulse: 85  Temp: 98.3  F (36.8  C)  Resp: 20  SpO2: 99 %  Physical Exam   Constitutional: She is oriented to person, place, and time. She appears well-developed and well-nourished. No distress.   Appears a bit fatigued.    HENT:   Head: Normocephalic and atraumatic.   Right Ear: Tympanic membrane and external ear normal.   Left Ear: Tympanic membrane and external ear normal.   Nose: Nose normal.   Mouth/Throat: Uvula is midline, oropharynx is clear and moist and mucous membranes are normal. No oropharyngeal exudate.   Eyes: Conjunctivae and lids are normal. Right eye exhibits no discharge. Left eye exhibits no discharge. No " scleral icterus.   Neck: Normal range of motion. Neck supple.   Cardiovascular: Normal rate, regular rhythm and normal heart sounds.    Pulmonary/Chest: Effort normal. No respiratory distress. She has no wheezes.   Diminished breath sounds in RLL.   Musculoskeletal: Normal range of motion.   Lymphadenopathy:     She has no cervical adenopathy.   Neurological: She is alert and oriented to person, place, and time. Coordination normal.   Skin: Skin is warm, dry and intact. She is not diaphoretic.   Psychiatric: She has a normal mood and affect. Her behavior is normal.   Nursing note and vitals reviewed.      ED Course     ED Course     Procedures    CXR: Clear chest. Pending radiology read.     Labs Ordered and Resulted from Time of ED Arrival Up to the Time of Departure from the ED   CBC WITH PLATELETS DIFFERENTIAL   RAPID STREP SCREEN   BETA STREP GROUP A CULTURE       Assessments & Plan (with Medical Decision Making)     I have reviewed the nursing notes.    I have reviewed the findings, diagnosis, plan and need for follow up with the patient.  Given written educational materials.     Advised pt:   Rest, drink plenty of fluids.   Take medications as ordered.   Call for an appointment to see PCP in 3 days for re-evaluation.   Return here if symptoms worsen.     Discharge Medication List as of 10/8/2017  9:14 PM      START taking these medications    Details   amoxicillin-clavulanate (AUGMENTIN) 875-125 MG per tablet Take 1 tablet by mouth 2 times daily for 10 days, Disp-20 tablet, R-0, E-Prescribe      predniSONE (DELTASONE) 20 MG tablet Take two tablets (= 40mg) each day for 5 (five) days, Disp-10 tablet, R-0, E-Prescribe           Final diagnoses:   Acute sinusitis with symptoms greater than 10 days   COPD exacerbation (H)       10/8/2017   [facility]     Lynn Crook NP  10/08/17 3321

## 2017-10-10 LAB
BACTERIA SPEC CULT: NORMAL
SPECIMEN SOURCE: NORMAL

## 2017-10-28 ENCOUNTER — APPOINTMENT (OUTPATIENT)
Dept: GENERAL RADIOLOGY | Facility: HOSPITAL | Age: 72
End: 2017-10-28
Attending: EMERGENCY MEDICINE
Payer: MEDICARE

## 2017-10-28 ENCOUNTER — HOSPITAL ENCOUNTER (EMERGENCY)
Facility: HOSPITAL | Age: 72
Discharge: HOME | End: 2017-10-28
Attending: EMERGENCY MEDICINE | Admitting: EMERGENCY MEDICINE
Payer: MEDICARE

## 2017-10-28 VITALS
HEART RATE: 78 BPM | SYSTOLIC BLOOD PRESSURE: 133 MMHG | TEMPERATURE: 99.2 F | OXYGEN SATURATION: 94 % | RESPIRATION RATE: 16 BRPM | DIASTOLIC BLOOD PRESSURE: 66 MMHG

## 2017-10-28 DIAGNOSIS — J44.1 COPD EXACERBATION (H): Primary | ICD-10-CM

## 2017-10-28 LAB
ANION GAP SERPL CALCULATED.3IONS-SCNC: 8 MMOL/L (ref 3–14)
BASOPHILS # BLD AUTO: 0.1 10E9/L (ref 0–0.2)
BASOPHILS NFR BLD AUTO: 0.5 %
BUN SERPL-MCNC: 13 MG/DL (ref 7–30)
CALCIUM SERPL-MCNC: 8.8 MG/DL (ref 8.5–10.1)
CHLORIDE SERPL-SCNC: 102 MMOL/L (ref 94–109)
CO2 SERPL-SCNC: 25 MMOL/L (ref 20–32)
CREAT SERPL-MCNC: 0.66 MG/DL (ref 0.52–1.04)
CRP SERPL-MCNC: 67 MG/L (ref 0–8)
DIFFERENTIAL METHOD BLD: NORMAL
EOSINOPHIL # BLD AUTO: 0.2 10E9/L (ref 0–0.7)
EOSINOPHIL NFR BLD AUTO: 2.1 %
ERYTHROCYTE [DISTWIDTH] IN BLOOD BY AUTOMATED COUNT: 12.5 % (ref 10–15)
FLUAV+FLUBV AG SPEC QL: NEGATIVE
FLUAV+FLUBV AG SPEC QL: NEGATIVE
GFR SERPL CREATININE-BSD FRML MDRD: 88 ML/MIN/1.7M2
GLUCOSE SERPL-MCNC: 96 MG/DL (ref 70–99)
HCT VFR BLD AUTO: 37.3 % (ref 35–47)
HGB BLD-MCNC: 12.6 G/DL (ref 11.7–15.7)
IMM GRANULOCYTES # BLD: 0.1 10E9/L (ref 0–0.4)
IMM GRANULOCYTES NFR BLD: 0.5 %
LYMPHOCYTES # BLD AUTO: 2 10E9/L (ref 0.8–5.3)
LYMPHOCYTES NFR BLD AUTO: 18.2 %
MCH RBC QN AUTO: 29.4 PG (ref 26.5–33)
MCHC RBC AUTO-ENTMCNC: 33.8 G/DL (ref 31.5–36.5)
MCV RBC AUTO: 87 FL (ref 78–100)
MONOCYTES # BLD AUTO: 0.8 10E9/L (ref 0–1.3)
MONOCYTES NFR BLD AUTO: 6.9 %
NEUTROPHILS # BLD AUTO: 7.8 10E9/L (ref 1.6–8.3)
NEUTROPHILS NFR BLD AUTO: 71.8 %
NRBC # BLD AUTO: 0 10*3/UL
NRBC BLD AUTO-RTO: 0 /100
PLATELET # BLD AUTO: 256 10E9/L (ref 150–450)
POTASSIUM SERPL-SCNC: 3.6 MMOL/L (ref 3.4–5.3)
RBC # BLD AUTO: 4.28 10E12/L (ref 3.8–5.2)
SODIUM SERPL-SCNC: 135 MMOL/L (ref 133–144)
SPECIMEN SOURCE: NORMAL
WBC # BLD AUTO: 10.8 10E9/L (ref 4–11)

## 2017-10-28 PROCEDURE — 99284 EMERGENCY DEPT VISIT MOD MDM: CPT | Performed by: EMERGENCY MEDICINE

## 2017-10-28 PROCEDURE — 80048 BASIC METABOLIC PNL TOTAL CA: CPT | Performed by: EMERGENCY MEDICINE

## 2017-10-28 PROCEDURE — 85025 COMPLETE CBC W/AUTO DIFF WBC: CPT | Performed by: EMERGENCY MEDICINE

## 2017-10-28 PROCEDURE — 36415 COLL VENOUS BLD VENIPUNCTURE: CPT | Performed by: EMERGENCY MEDICINE

## 2017-10-28 PROCEDURE — 86140 C-REACTIVE PROTEIN: CPT | Performed by: EMERGENCY MEDICINE

## 2017-10-28 PROCEDURE — 71020 XR CHEST 2 VW: CPT | Mod: TC

## 2017-10-28 PROCEDURE — 94640 AIRWAY INHALATION TREATMENT: CPT

## 2017-10-28 PROCEDURE — 87804 INFLUENZA ASSAY W/OPTIC: CPT | Performed by: EMERGENCY MEDICINE

## 2017-10-28 PROCEDURE — 25000125 ZZHC RX 250: Performed by: EMERGENCY MEDICINE

## 2017-10-28 PROCEDURE — 99284 EMERGENCY DEPT VISIT MOD MDM: CPT | Mod: 25

## 2017-10-28 RX ORDER — HYDROCODONE BITARTRATE AND ACETAMINOPHEN 5; 325 MG/1; MG/1
2 TABLET ORAL EVERY 6 HOURS PRN
COMMUNITY
End: 2018-06-19

## 2017-10-28 RX ORDER — DOXYCYCLINE 100 MG/1
100 CAPSULE ORAL 2 TIMES DAILY
Qty: 14 CAPSULE | Refills: 0 | Status: SHIPPED | OUTPATIENT
Start: 2017-10-28 | End: 2017-11-11

## 2017-10-28 RX ORDER — PREDNISONE 20 MG/1
40 TABLET ORAL ONCE
Status: COMPLETED | OUTPATIENT
Start: 2017-10-28 | End: 2017-10-28

## 2017-10-28 RX ORDER — IPRATROPIUM BROMIDE AND ALBUTEROL SULFATE 2.5; .5 MG/3ML; MG/3ML
3 SOLUTION RESPIRATORY (INHALATION) ONCE
Status: COMPLETED | OUTPATIENT
Start: 2017-10-28 | End: 2017-10-28

## 2017-10-28 RX ORDER — PREDNISONE 20 MG/1
TABLET ORAL
Qty: 10 TABLET | Refills: 0 | Status: SHIPPED | OUTPATIENT
Start: 2017-10-28 | End: 2018-06-19

## 2017-10-28 RX ADMIN — IPRATROPIUM BROMIDE AND ALBUTEROL SULFATE 3 ML: .5; 3 SOLUTION RESPIRATORY (INHALATION) at 15:11

## 2017-10-28 RX ADMIN — PREDNISONE 40 MG: 20 TABLET ORAL at 16:04

## 2017-10-28 NOTE — ED NOTES
"Pt states last few days \"I have a deep cough and feel sick\". \"When I cough my whole body hurts\". Denies any vomiting or diarhea. States she has been feeling \"sweaty and stuff\".  "

## 2017-10-28 NOTE — ED AVS SNAPSHOT
[facility]    [address]    [place] [address]    Phone:  [phone]                                       Na Vogel   MRN: [id number]    Department:  [facility]   Date of Visit:  10/28/2017           Patient Information     Date Of Birth          1945        Your diagnoses for this visit were:     COPD exacerbation (H)        You were seen by Armando Booth MD.         Review of your medicines      START taking        Dose / Directions Last dose taken    doxycycline 100 MG capsule   Commonly known as:  VIBRAMYCIN   Dose:  100 mg   Quantity:  14 capsule        Take 1 capsule (100 mg) by mouth 2 times daily for 14 days   Refills:  0        predniSONE 20 MG tablet   Commonly known as:  DELTASONE   Quantity:  10 tablet        Take two tablets (= 40mg) each day for 5 (five) days   Refills:  0          Our records show that you are taking the medicines listed below. If these are incorrect, please call your family doctor or clinic.        Dose / Directions Last dose taken    albuterol 108 (90 BASE) MCG/ACT Inhaler   Commonly known as:  PROAIR HFA   Quantity:  3 Inhaler        INHALE 2 PUFFS INTO THE LUNGS EVERY 6 HOURS   Refills:  3        budesonide-formoterol 160-4.5 MCG/ACT Inhaler   Commonly known as:  SYMBICORT   Dose:  2 puff   Quantity:  3 Inhaler        Inhale 2 puffs into the lungs 2 times daily   Refills:  3        citalopram 20 MG tablet   Commonly known as:  celeXA   Quantity:  90 tablet        TAKE 1 TABLET BY MOUTH EVERY DAY   Refills:  0        esomeprazole 40 MG CR capsule   Commonly known as:  nexIUM   Quantity:  90 capsule        TAKE ONE CAPSULE BY MOUTH EVERY DAY   Refills:  0        HYDROcodone-acetaminophen 5-325 MG per tablet   Commonly known as:  NORCO   Dose:  2 tablet        Take 2 tablets by mouth every 6 hours as needed for moderate to severe pain   Refills:  0        ipratropium - albuterol 0.5 mg/2.5 mg/3 mL 0.5-2.5 (3) MG/3ML neb  "solution   Commonly known as:  DUONEB   Quantity:  1080 mL        NEBULIZE 1 VIAL EVERY 6 HOURS AS NEEDED FOR SHORTNESS OF BREATH, DYSPNEA, OR WHEEZING   Refills:  5        order for DME   Quantity:  1 Device        Equipment being ordered: Nebulizer   Refills:  0        SYNTHROID 112 MCG tablet   Dose:  112 mcg   Quantity:  90 tablet   Generic drug:  levothyroxine        Take 1 tablet (112 mcg) by mouth daily   Refills:  0                Prescriptions were sent or printed at these locations (2 Prescriptions)                   [facility] [address] [place] [address]  AT [facility] [address]   [address] [place] [address]    Telephone:  [phone]   Fax:  [phone]   Hours:                  E-Prescribed (2 of 2)         doxycycline (VIBRAMYCIN) 100 MG capsule               predniSONE (DELTASONE) 20 MG tablet                Procedures and tests performed during your visit     Basic metabolic panel    CBC with platelets differential    CRP inflammation    Chest XR,  PA & LAT    Influenza A/B antigen      Orders Needing Specimen Collection     None      Pending Results     No orders found from 10/26/2017 to 10/29/2017.            Pending Culture Results     No orders found from 10/26/2017 to 10/29/2017.            Thank you for choosing [facility]       Thank you for choosing [facility] for your care. Our goal is always to provide you with excellent care. Hearing back from our patients is one way we can continue to improve our services. Please take a few minutes to complete the written survey that you may receive in the mail after you visit with us. Thank you!        [employer] Information     [employer] lets you send messages to your doctor, view your test results, renew your prescriptions, schedule appointments and more. To sign up, go to [email or web address] . Click on \"Log in\" on the left side of the screen, which will take you to the Welcome page. Then click on \"Sign up Now\" " on the right side of the page.     You will be asked to enter the access code listed below, as well as some personal information. Please follow the directions to create your username and password.     Your access code is: [email or web address]  Expires: 2018  9:14 PM     Your access code will  in 90 days. If you need help or a new code, please call your [facility] clinic or [phone].        Care EveryWhere ID     This is your Care EveryWhere ID. This could be used by other organizations to access your [facility] medical records  [id number]        Equal Access to Services     [facility] : Alicja Plasencia, sara spicer, andrei lewis, shaista huff . So [facility] [phone].    ATENCIÓN: Si habla español, tiene a espinoza disposición servicios gratuitos de asistencia lingüística. Llame al [phone].    We comply with applicable federal civil rights laws and [place] laws. We do not discriminate on the basis of race, color, national origin, age, disability, sex, sexual orientation, or gender identity.            After Visit Summary       This is your record. Keep this with you and show to your community pharmacist(s) and doctor(s) at your next visit.

## 2017-10-28 NOTE — ED PROVIDER NOTES
History     Chief Complaint   Patient presents with     COPD     URI     HPI  Na Vogel is a 71 year old female who presents to the ED with several days history of cough productive of yellow sputum.  She also feels a little bit short of breath.  She is known to have COPD and has been using duo nebs at home.  Last DuoNeb was done approximately 4 hours ago.  He denies any chest pain or fever.  She has been treated several for sinus infections.  No nausea or vomiting, no diarrhea, no orthopnea or paroxysmal nocturnal dyspnea.  Denies any night sweats or weight loss.    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     Priority: Medium     Rosacea 08/29/2013     Priority: Medium     Hyperlipidemia LDL goal <100 07/25/2013     Priority: Medium     Acquired hypothyroidism      Priority: Medium     Gastroesophageal reflux disease      Priority: Medium        Past Medical History:    Past Medical History:   Diagnosis Date     Acute pharyngitis 02/24/2000     Acute sinusitis, unspecified 11/10/2000     Acute upper respiratory infections of unspecified 09/29/2005     Bronchitis, not specified as acute or chronic 01/07/2002     Cholecystitis, unspecified 03/06/2001     Dizziness and giddiness 10/29/2001     Dysuria 02/17/2005     Esophageal reflux 11/26/2002     Follow-up examination, following other surgery 08/01/2002     Follow-up examination, following unspecified surge 03/22/2001      Gastro-oesophageal reflux disease      Giant cell arteritis (H) 06/28/2000     Hiatal hernia      Midline low back pain without sciatica 8/29/2016     Mild persistent asthma 1/13/2014     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     Myalgia and myositis, unspecified 06/28//2000     Need for prophylactic vaccination and inoculation,Need for prophylactic vaccination and inoculation, 10/22/2004     Osteoarthrosis, unspecified whether generalized or 06/18/2002     Other abnormal findings on radiological examinatio 04/19/2002     Other and unspecified hyperlipidemia 01/23/2001     Other malaise and fatigue 11/12/2004     Other screening mammogram 11/27/2002     Other specified pre-operative examination 07/20/2005     Pain in joint involving lower leg 06/07/2002     Pain in joint, site unspecified 03/02/2000     Pure hypercholesterolemia 07/22/2003     Rosacea 8/29/2013     Routine [facility] medical examination at [facility] 04/15/2002     Thyroid disease      Unspecified acquired hypothyroidism 11/22/2002     Unspecified hypertrophic and atrophic conditions o 11/09/1999     Unspecified otitis media 09/25/2007     Unspecified sinusitis (chronic) 11/04/1999       Past Surgical History:    Past Surgical History:   Procedure Laterality Date     APPENDECTOMY       CHOLECYSTECTOMY       COLONOSCOPY  07-    Repeat 10 years     COLONOSCOPY  2012     GYN SURGERY       LIGATE VEIN VARICOSE, PHLEBECTOMY MULTIPLE STAB, COMBINED  4/16/2014    Procedure: BILATERAL ANTERIOR AND POSTERIOR STAB PHLEBECTOMY, EXCISION SOFT TISSUE MASS RIGHT LOWER BUTTOCK;  Surgeon: Lara Galeano DO;  Location: [facility]     ORTHOPEDIC SURGERY         Family History:    Family History   Problem Relation Age of Onset     Thyroid Disease Mother      Prostate Cancer Father      CANCER Father      cancer of bone       Social History:  Marital Status:   [2]  Social History   Substance Use Topics     Smoking status: Never Smoker     Smokeless  tobacco: Never Used     Alcohol use No        Medications:      HYDROcodone-acetaminophen (NORCO) 5-325 MG per tablet   doxycycline (VIBRAMYCIN) 100 MG capsule   predniSONE (DELTASONE) 20 MG tablet   SYNTHROID 112 MCG tablet   citalopram (CELEXA) 20 MG tablet   esomeprazole (NEXIUM) 40 MG CR capsule   albuterol (ALBUTEROL) 108 (90 BASE) MCG/ACT inhaler   budesonide-formoterol (SYMBICORT) 160-4.5 MCG/ACT inhaler   ipratropium - albuterol 0.5 mg/2.5 mg/3 mL (DUONEB) 0.5-2.5 (3) MG/3ML nebulization   ORDER FOR DME         Review of Systems   All other systems reviewed and are negative.      Physical Exam   BP: 148/66  Pulse: 78  Heart Rate: 76  Temp: 96.8  F (36  C)  Resp: 20  SpO2: 99 %      Physical Exam   Constitutional: She is oriented to person, place, and time. She appears well-developed and well-nourished. No distress.   HENT:   Head: Atraumatic.   Mouth/Throat: Oropharynx is clear and moist. No oropharyngeal exudate.   Eyes: Pupils are equal, round, and reactive to light. No scleral icterus.   Cardiovascular: Normal rate, regular rhythm, normal heart sounds and intact distal pulses.    Pulmonary/Chest: No respiratory distress. She has wheezes. She has rales.   Abdominal: Soft. Bowel sounds are normal. There is no tenderness. There is no rebound.   Musculoskeletal: She exhibits no edema or tenderness.   Neurological: She is alert and oriented to person, place, and time.   Skin: Skin is warm. No rash noted. She is not diaphoretic.   Nursing note and vitals reviewed.      ED Course   Patient evaluated and laboratory tests ordered.  DuoNeb's given.    ED Course     Procedures           Labs Ordered and Resulted from Time of ED Arrival Up to the Time of Departure from the ED   CRP INFLAMMATION - Abnormal; Notable for the following:        Result Value    CRP Inflammation 67.0 (*)     All other components within normal limits   CBC WITH PLATELETS DIFFERENTIAL   BASIC METABOLIC PANEL   INFLUENZA A/B ANTIGEN        Assessments & Plan (with Medical Decision Making)   COPD exacerbation: Patient nebulized with DuoNeb's in the emergency department.  Chest x-ray shows mild bilateral basilar atelectasis.  Normal CBC, CMP, elevated CRP and EKG.  Negative influenza a and B. Subsequently discharged home on doxycycline and prednisone for the next 5 days.  We will continue duo nebs at home.  Follow-up with PCP if not better early next week.  Discharge home.    I have reviewed the nursing notes.    I have reviewed the findings, diagnosis, plan and need for follow up with the patient.    Discharge Medication List as of 10/28/2017  3:57 PM      START taking these medications    Details   doxycycline (VIBRAMYCIN) 100 MG capsule Take 1 capsule (100 mg) by mouth 2 times daily for 14 days, Disp-14 capsule, R-0, E-Prescribe      predniSONE (DELTASONE) 20 MG tablet Take two tablets (= 40mg) each day for 5 (five) days, Disp-10 tablet, R-0, E-Prescribe             Final diagnoses:   COPD exacerbation (H)       10/28/2017   [facility]     Armando Booth MD  10/28/17 0520

## 2017-10-28 NOTE — ED AVS SNAPSHOT
[facility]    [address] [place] [address]    Phone:  [phone]                                       Na Vogel   MRN: [id number]    Department:  [facility]   Date of Visit:  10/28/2017           After Visit Summary Signature Page     I have received my discharge instructions, and my questions have been answered. I have discussed any challenges I see with this plan with the nurse or doctor.    ..........................................................................................................................................  Patient/Patient Representative Signature      ..........................................................................................................................................  Patient Representative Print Name and Relationship to Patient    ..................................................               ................................................  Date                                            Time    ..........................................................................................................................................  Reviewed by Signature/Title    ...................................................              ..............................................  Date                                                            Time

## 2018-06-19 ENCOUNTER — HOSPITAL ENCOUNTER (EMERGENCY)
Facility: HOSPITAL | Age: 73
Discharge: HOME | End: 2018-06-19
Attending: INTERNAL MEDICINE | Admitting: INTERNAL MEDICINE
Payer: MEDICARE

## 2018-06-19 VITALS
RESPIRATION RATE: 18 BRPM | OXYGEN SATURATION: 95 % | TEMPERATURE: 97.6 F | SYSTOLIC BLOOD PRESSURE: 160 MMHG | DIASTOLIC BLOOD PRESSURE: 96 MMHG

## 2018-06-19 DIAGNOSIS — N95.2 ATROPHIC VAGINITIS: ICD-10-CM

## 2018-06-19 DIAGNOSIS — N30.00 ACUTE CYSTITIS WITHOUT HEMATURIA: ICD-10-CM

## 2018-06-19 LAB
ALBUMIN SERPL-MCNC: 3.6 G/DL (ref 3.4–5)
ALBUMIN UR-MCNC: NEGATIVE MG/DL
ALP SERPL-CCNC: 83 U/L (ref 40–150)
ALT SERPL W P-5'-P-CCNC: 21 U/L (ref 0–50)
ANION GAP SERPL CALCULATED.3IONS-SCNC: 9 MMOL/L (ref 3–14)
APPEARANCE UR: CLEAR
AST SERPL W P-5'-P-CCNC: 16 U/L (ref 0–45)
BACTERIA #/AREA URNS HPF: ABNORMAL /HPF
BASOPHILS # BLD AUTO: 0.1 10E9/L (ref 0–0.2)
BASOPHILS NFR BLD AUTO: 0.6 %
BILIRUB SERPL-MCNC: 0.3 MG/DL (ref 0.2–1.3)
BILIRUB UR QL STRIP: NEGATIVE
BUN SERPL-MCNC: 15 MG/DL (ref 7–30)
CALCIUM SERPL-MCNC: 8.8 MG/DL (ref 8.5–10.1)
CHLORIDE SERPL-SCNC: 101 MMOL/L (ref 94–109)
CO2 SERPL-SCNC: 25 MMOL/L (ref 20–32)
COLOR UR AUTO: ABNORMAL
CREAT SERPL-MCNC: 0.68 MG/DL (ref 0.52–1.04)
CRP SERPL-MCNC: 6.8 MG/L (ref 0–8)
DIFFERENTIAL METHOD BLD: NORMAL
EOSINOPHIL # BLD AUTO: 0.2 10E9/L (ref 0–0.7)
EOSINOPHIL NFR BLD AUTO: 2.2 %
ERYTHROCYTE [DISTWIDTH] IN BLOOD BY AUTOMATED COUNT: 11.7 % (ref 10–15)
GFR SERPL CREATININE-BSD FRML MDRD: 84 ML/MIN/1.7M2
GLUCOSE SERPL-MCNC: 95 MG/DL (ref 70–99)
GLUCOSE UR STRIP-MCNC: NEGATIVE MG/DL
HCT VFR BLD AUTO: 38.8 % (ref 35–47)
HGB BLD-MCNC: 13.5 G/DL (ref 11.7–15.7)
HGB UR QL STRIP: NEGATIVE
HYALINE CASTS #/AREA URNS LPF: 1 /LPF
IMM GRANULOCYTES # BLD: 0 10E9/L (ref 0–0.4)
IMM GRANULOCYTES NFR BLD: 0.4 %
KETONES UR STRIP-MCNC: NEGATIVE MG/DL
LEUKOCYTE ESTERASE UR QL STRIP: ABNORMAL
LYMPHOCYTES # BLD AUTO: 2.5 10E9/L (ref 0.8–5.3)
LYMPHOCYTES NFR BLD AUTO: 32.1 %
MCH RBC QN AUTO: 29.5 PG (ref 26.5–33)
MCHC RBC AUTO-ENTMCNC: 34.8 G/DL (ref 31.5–36.5)
MCV RBC AUTO: 85 FL (ref 78–100)
MONOCYTES # BLD AUTO: 0.6 10E9/L (ref 0–1.3)
MONOCYTES NFR BLD AUTO: 7.1 %
NEUTROPHILS # BLD AUTO: 4.5 10E9/L (ref 1.6–8.3)
NEUTROPHILS NFR BLD AUTO: 57.6 %
NITRATE UR QL: NEGATIVE
NRBC # BLD AUTO: 0 10*3/UL
NRBC BLD AUTO-RTO: 0 /100
PH UR STRIP: 6 PH (ref 4.7–8)
PLATELET # BLD AUTO: 268 10E9/L (ref 150–450)
POTASSIUM SERPL-SCNC: 3.9 MMOL/L (ref 3.4–5.3)
PROT SERPL-MCNC: 7.1 G/DL (ref 6.8–8.8)
RBC # BLD AUTO: 4.58 10E12/L (ref 3.8–5.2)
RBC #/AREA URNS AUTO: <1 /HPF (ref 0–2)
SODIUM SERPL-SCNC: 135 MMOL/L (ref 133–144)
SOURCE: ABNORMAL
SP GR UR STRIP: 1 (ref 1–1.03)
SQUAMOUS #/AREA URNS AUTO: 1 /HPF (ref 0–1)
UROBILINOGEN UR STRIP-MCNC: NORMAL MG/DL (ref 0–2)
WBC # BLD AUTO: 7.7 10E9/L (ref 4–11)
WBC #/AREA URNS AUTO: <1 /HPF (ref 0–5)

## 2018-06-19 PROCEDURE — 25000132 ZZH RX MED GY IP 250 OP 250 PS 637: Mod: GY | Performed by: INTERNAL MEDICINE

## 2018-06-19 PROCEDURE — 85025 COMPLETE CBC W/AUTO DIFF WBC: CPT | Performed by: FAMILY MEDICINE

## 2018-06-19 PROCEDURE — 99283 EMERGENCY DEPT VISIT LOW MDM: CPT

## 2018-06-19 PROCEDURE — 81001 URINALYSIS AUTO W/SCOPE: CPT | Performed by: INTERNAL MEDICINE

## 2018-06-19 PROCEDURE — A9270 NON-COVERED ITEM OR SERVICE: HCPCS | Mod: GY | Performed by: INTERNAL MEDICINE

## 2018-06-19 PROCEDURE — 87086 URINE CULTURE/COLONY COUNT: CPT | Performed by: INTERNAL MEDICINE

## 2018-06-19 PROCEDURE — 99284 EMERGENCY DEPT VISIT MOD MDM: CPT | Performed by: INTERNAL MEDICINE

## 2018-06-19 PROCEDURE — 36415 COLL VENOUS BLD VENIPUNCTURE: CPT | Performed by: FAMILY MEDICINE

## 2018-06-19 PROCEDURE — 80053 COMPREHEN METABOLIC PANEL: CPT | Performed by: FAMILY MEDICINE

## 2018-06-19 PROCEDURE — 86140 C-REACTIVE PROTEIN: CPT | Performed by: FAMILY MEDICINE

## 2018-06-19 RX ORDER — CEPHALEXIN 500 MG/1
500 CAPSULE ORAL ONCE
Status: COMPLETED | OUTPATIENT
Start: 2018-06-19 | End: 2018-06-19

## 2018-06-19 RX ORDER — CEPHALEXIN 500 MG/1
500 CAPSULE ORAL 3 TIMES DAILY
Qty: 15 CAPSULE | Refills: 0 | Status: SHIPPED | OUTPATIENT
Start: 2018-06-19 | End: 2018-06-24

## 2018-06-19 RX ORDER — PHENAZOPYRIDINE HYDROCHLORIDE 95 MG/1
95 TABLET ORAL 3 TIMES DAILY
Qty: 8 TABLET | Refills: 0 | Status: SHIPPED | OUTPATIENT
Start: 2018-06-19 | End: 2018-06-22

## 2018-06-19 RX ORDER — PHENAZOPYRIDINE HYDROCHLORIDE 100 MG/1
200 TABLET, FILM COATED ORAL ONCE
Status: COMPLETED | OUTPATIENT
Start: 2018-06-19 | End: 2018-06-19

## 2018-06-19 RX ADMIN — PHENAZOPYRIDINE HYDROCHLORIDE 200 MG: 100 TABLET, COATED ORAL at 23:22

## 2018-06-19 RX ADMIN — CEPHALEXIN 500 MG: 500 CAPSULE ORAL at 23:24

## 2018-06-19 NOTE — ED AVS SNAPSHOT
[facility]    [address] [address]    Phone:  [phone]                                       Na Vogel   MRN: [id number]    Department:  [facility]   Date of Visit:  6/19/2018           After Visit Summary Signature Page     I have received my discharge instructions, and my questions have been answered. I have discussed any challenges I see with this plan with the nurse or doctor.    ..........................................................................................................................................  Patient/Patient Representative Signature      ..........................................................................................................................................  Patient Representative Print Name and Relationship to Patient    ..................................................               ................................................  Date                                            Time    ..........................................................................................................................................  Reviewed by Signature/Title    ...................................................              ..............................................  Date                                                            Time

## 2018-06-19 NOTE — ED AVS SNAPSHOT
[facility]    [address]    [place] [address]    Phone:  [phone]                                       Na Vogel   MRN: [id number]    Department:  [facility]   Date of Visit:  6/19/2018           Patient Information     Date Of Birth          1945        Your diagnoses for this visit were:     Atrophic vaginitis     Acute cystitis without hematuria        You were seen by Miguel Diaz MD.      Follow-up Information     Schedule an appointment as soon as possible for a visit with Roxana Ferreira.    Contact information:    [facility]  [address] [place] [address]  [phone]          Discharge Instructions         Atrophic Vaginitis    Atrophic vaginitis means the walls of your vagina have become thin. This happens when your body makes too little of the hormone estrogen. Menopause or surgical removal of the ovaries are the most common causes for a drop in estrogen. Breastfeeding can also cause the hormone level to drop.  Symptoms of atrophic vaginitis include:    Dryness, soreness, burning, or itching in the vagina    Vaginal discharge  Sex can be uncomfortable, even painful. After sex, you may have bleeding from your vagina. You may also have burning or pain when you urinate.  Home care  Your healthcare provider may recommend 1 or more of these as treatment:    Vaginal creams, lotions, and lubricants. These products help relieve vaginal dryness. They don t need a prescription. They can be found in the personal care section of most pharmacies. Creams and lotions are used daily to help keep the vagina moist. Lubricants help reduce dryness and pain during sex. Choose water-based lubricants. Don t use petroleum jelly, mineral oil, or other oils. These increase the chance of infection.     Hormone therapy (HT). HT increases the amount of estrogen in your body. This can help manage or relieve symptoms. HT can be given in pill form. It may  be given as a lotion, cream, ring put into the vagina, or a patch on the skin. The risks and benefits of HT vary for each woman. For instance, your risk may be higher if you have had breast cancer. Discuss this treatment with your healthcare provider. Not every woman can use HT.  You don t need to give up (abstain from) sex. In fact, regular sex can help keep vaginal tissues healthy. Take steps to make sex more comfortable by using water-based lubricants.  Preventing infections  Atrophic vaginitis makes an infection of the vagina or the urinary tract more likely. To help reduce your risk:    Keep your genitals clean. When you bathe, wash the outside of your vagina with mild soap and water. Clean gently between the folds of your vagina.    Wipe from front to back after a bowel movement.    Don t douche unless your healthcare provider tells you to.    Avoid scented toilet paper, scented vaginal sprays, and scented tampons.    Avoid wearing clothes that are tight in the crotch. These include pantyhose, jeans, and leggings. Wear cotton underwear. Change it every day.  Follow-up care  Follow up with your healthcare provider, or as advised.  When to seek medical advice  Call your health care provider right away if any of these occur:    Fever of 100.4 F (38 C) or higher, or as directed by your healthcare provider    Symptoms don t go away or get worse even with treatment    Vaginal area swells or becomes painful    Vaginal area bleeds, but not because of your period    Bad-smelling discharge from the vagina    Pain or burning when you urinate or you have trouble passing urine    Open sores develop around vagina   Date Last Reviewed: 12/1/2016 2000-2017 [employer]. [address] [place] [address], [place] [address]. All rights reserved. This information is not intended as a substitute for professional medical care. Always follow your healthcare professional's instructions.         * BLADDER INFECTION,Female  "(Adult)    A bladder infection (\"cystitis\" or \"UTI\") usually causes a constant urge to urinate and a burning when passing urine. Urine may be cloudy, smelly or dark. There may be pain in the lower abdomen. A bladder infection occurs when bacteria from the vaginal area enter the bladder opening (urethra). This can occur from sexual intercourse, wearing tight clothing, dehydration and other factors.  HOME CARE:  1. Drink lots of fluids (at least 6-8 glasses a day, unless you must restrict fluids for other medical reasons). This will force the medicine into your urinary system and flush the bacteria out of your body. Cranberry juice has been shown to help clear out the bacteria.  2. Avoid sexual intercourse until your symptoms are gone.  3. A bladder infection is treated with antibiotics. You may also be given Pyridium (generic = phenazopyridine) to reduce the burning sensation. This medicine will cause your urine to become a bright orange color. The orange urine may stain clothing. You may wear a pad or panty-liner to protect clothing.  PREVENTING FUTURE INFECTIONS:  1. Always wipe from front to back after a bowel movement.  2. Keep the genital area clean and dry.  3. Drink plenty of fluids each day to avoid dehydration.  4. Urinate right after intercourse to flush out the bladder.  5. Wear cotton underwear and cotton-lined panty hose; avoid tight-fitting pants.  6. If you are on birth control pills and are having frequent bladder infections, discuss with your doctor.  FOLLOW UP: Return to this facility or see your doctor if ALL symptoms are not gone after three days of treatment.  GET PROMPT MEDICAL ATTENTION if any of the following occur:    Fever over 101 F (38.3 C)    No improvement by the third day of treatment    Increasing back or abdominal pain    Repeated vomiting; unable to keep medicine down    Weakness, dizziness or fainting    Vaginal discharge    Pain, redness or swelling in the labia (outer vaginal " area)    3101-7077 [employer]. [address] [place] [address]. All rights reserved. This information is not intended as a substitute for professional medical care. Always follow your healthcare professional's instructions.  This information has been modified by your health care provider with permission from the publisher.         Review of your medicines      START taking        Dose / Directions Last dose taken    cephALEXin 500 MG capsule   Commonly known as:  KEFLEX   Dose:  500 mg   Quantity:  15 capsule        Take 1 capsule (500 mg) by mouth 3 times daily for 5 days   Refills:  0        conjugated estrogens cream   Commonly known as:  PREMARIN   Dose:  0.5 g   Quantity:  30 g   Start taking on:  6/21/2018        Place 0.5 g vaginally twice a week   Refills:  0        phenazopyridine HCl tablet   Commonly known as:  AZO URINARY PAIN RELIEF   Dose:  95 mg   Quantity:  8 tablet        Take 1 tablet (95 mg) by mouth 3 times daily for 3 days   Refills:  0          Our records show that you are taking the medicines listed below. If these are incorrect, please call your family doctor or clinic.        Dose / Directions Last dose taken    albuterol 108 (90 Base) MCG/ACT Inhaler   Commonly known as:  PROAIR HFA   Quantity:  3 Inhaler        INHALE 2 PUFFS INTO THE LUNGS EVERY 6 HOURS   Refills:  3        budesonide-formoterol 160-4.5 MCG/ACT Inhaler   Commonly known as:  SYMBICORT   Dose:  2 puff   Quantity:  3 Inhaler        Inhale 2 puffs into the lungs 2 times daily   Refills:  3        citalopram 20 MG tablet   Commonly known as:  celeXA   Quantity:  90 tablet        TAKE 1 TABLET BY MOUTH EVERY DAY   Refills:  0        esomeprazole 40 MG CR capsule   Commonly known as:  nexIUM   Quantity:  90 capsule        TAKE ONE CAPSULE BY MOUTH EVERY DAY   Refills:  0        ipratropium - albuterol 0.5 mg/2.5 mg/3 mL 0.5-2.5 (3) MG/3ML neb solution   Commonly known as:  DUONEB   Quantity:  1080 mL     "    NEBULIZE 1 VIAL EVERY 6 HOURS AS NEEDED FOR SHORTNESS OF BREATH, DYSPNEA, OR WHEEZING   Refills:  5        order for DME   Quantity:  1 Device        Equipment being ordered: Nebulizer   Refills:  0        SYNTHROID 112 MCG tablet   Dose:  112 mcg   Quantity:  90 tablet   Generic drug:  levothyroxine        Take 1 tablet (112 mcg) by mouth daily   Refills:  0                Prescriptions were sent or printed at these locations (3 Prescriptions)                   Other Prescriptions                Printed at Department/Unit printer (3 of 3)         cephALEXin (KEFLEX) 500 MG capsule               conjugated estrogens (PREMARIN) cream               phenazopyridine HCl (AZO URINARY PAIN RELIEF) tablet                Procedures and tests performed during your visit     CBC with platelets differential    CRP inflammation    Comprehensive metabolic panel    UA with Microscopic reflex to Culture      Orders Needing Specimen Collection     None      Pending Results     No orders found from 6/17/2018 to 6/20/2018.            Pending Culture Results     No orders found from 6/17/2018 to 6/20/2018.            Thank you for choosing [facility]       Thank you for choosing [facility] for your care. Our goal is always to provide you with excellent care. Hearing back from our patients is one way we can continue to improve our services. Please take a few minutes to complete the written survey that you may receive in the mail after you visit with us. Thank you!        [email or web address] Information     [employer] lets you send messages to your doctor, view your test results, renew your prescriptions, schedule appointments and more. To sign up, go to [email or web address] . Click on \"Log in\" on the left side of the screen, which will take you to the Welcome page. Then click on \"Sign up Now\" on the right side of the page.     You will be asked to enter the access code listed below, as well as some personal information. Please follow the " directions to create your username and password.     Your access code is: [email or web address]  Expires: 2018 11:25 PM     Your access code will  in 90 days. If you need help or a new code, please call your [facility] clinic or [phone].        Care EveryWhere ID     This is your Care EveryWhere ID. This could be used by other organizations to access your [facility] medical records  [id number]        Equal Access to Services     AUBREY WASHBURN : Hadii kiran sto Soseamus, waaxda luqadaha, qaybta kaalmada adeegyada, shaista huff . So [facility] [phone].    ATENCIÓN: Si habla español, tiene a espinoza disposición servicios gratuitos de asistencia lingüística. Llame al [phone].    We comply with applicable federal civil rights laws and [place] laws. We do not discriminate on the basis of race, color, national origin, age, disability, sex, sexual orientation, or gender identity.            After Visit Summary       This is your record. Keep this with you and show to your community pharmacist(s) and doctor(s) at your next visit.

## 2018-06-20 ASSESSMENT — ENCOUNTER SYMPTOMS
SHORTNESS OF BREATH: 0
VOMITING: 0
DYSURIA: 1
ANAL BLEEDING: 0
HEMATURIA: 0
CONFUSION: 0
WOUND: 0
ABDOMINAL PAIN: 1
CHEST TIGHTNESS: 0
FEVER: 0
WHEEZING: 0
FLANK PAIN: 0
NECK STIFFNESS: 0
VOICE CHANGE: 0
LIGHT-HEADEDNESS: 0
MYALGIAS: 0
DIZZINESS: 0
NECK PAIN: 0
NUMBNESS: 0
ABDOMINAL DISTENTION: 0
NAUSEA: 0
CHILLS: 0
HEADACHES: 0
BACK PAIN: 0
DIAPHORESIS: 0
BLOOD IN STOOL: 0
PALPITATIONS: 0
COLOR CHANGE: 0
COUGH: 0
ARTHRALGIAS: 0

## 2018-06-20 NOTE — ED NOTES
"C/o lower abdominal pain, pelvic pain, and vaginal burning for 7 days on and off. States the pain is worse today. Seen her PCP today at the clinic for symptoms. States she had an xray, ultrasound, blood work, and urine test done. C/o chills this evening and symptoms getting worse. Described the pain as a \"burning and hurting.\" \"Feels like there is something there that shouldn't be there.\" 2-3 looses stools a day. Pale. Appears uncomfortable. Denies any vaginal bleeding or discharge. Hx partial hysterectomy. IV established.   "

## 2018-06-20 NOTE — ED NOTES
Patient verbalizes understanding of discharge instructions. Afebrile. Paper prescriptions sent home with pt. Pt has an outpt abdominal CT scan scheduled Thursday.

## 2018-06-20 NOTE — ED PROVIDER NOTES
History     Chief Complaint   Patient presents with     vaginal burning     c/o vaginal burning noted today, denies discharge. seen at clinic today to r/o a bladder infection     Patient is a 72 year old female presenting with abdominal pain. The history is provided by the patient.   Abdominal Pain   Pain location:  Suprapubic  Pain quality: aching and sharp    Pain radiates to:  Does not radiate  Onset quality:  Gradual  Duration:  2 days  Timing:  Constant  Chronicity:  New  Associated symptoms: dysuria    Associated symptoms: no chest pain, no chills, no cough, no fever, no hematuria, no nausea, no shortness of breath, no vaginal bleeding, no vaginal discharge and no vomiting          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     Priority: Medium     Rosacea 08/29/2013     Priority: Medium     Hyperlipidemia LDL goal <100 07/25/2013     Priority: Medium     Acquired hypothyroidism      Priority: Medium     Gastroesophageal reflux disease      Priority: Medium        Past Medical History:    Past Medical History:   Diagnosis Date     Acute pharyngitis 02/24/2000     Acute sinusitis, unspecified 11/10/2000     Acute upper respiratory infections of unspecified 09/29/2005     Bronchitis, not specified as acute or chronic 01/07/2002     Cholecystitis, unspecified 03/06/2001     Dizziness and giddiness 10/29/2001     Dysuria 02/17/2005     Esophageal reflux 11/26/2002     Follow-up examination, following  other surgery 08/01/2002     Follow-up examination, following unspecified surge 03/22/2001     Gastro-oesophageal reflux disease      Giant cell arteritis (H) 06/28/2000     Hiatal hernia      Midline low back pain without sciatica 8/29/2016     Mild persistent asthma 1/13/2014     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     Myalgia and myositis, unspecified 06/28//2000     Need for prophylactic vaccination and inoculation,Need for prophylactic vaccination and inoculation, 10/22/2004     Osteoarthrosis, unspecified whether generalized or 06/18/2002     Other abnormal findings on radiological examinatio 04/19/2002     Other and unspecified hyperlipidemia 01/23/2001     Other malaise and fatigue 11/12/2004     Other screening mammogram 11/27/2002     Other specified pre-operative examination 07/20/2005     Pain in joint involving lower leg 06/07/2002     Pain in joint, site unspecified 03/02/2000     Pure hypercholesterolemia 07/22/2003     Rosacea 8/29/2013     Routine [facility] medical examination at [facility] 04/15/2002     Thyroid disease      Unspecified acquired hypothyroidism 11/22/2002     Unspecified hypertrophic and atrophic conditions o 11/09/1999     Unspecified otitis media 09/25/2007     Unspecified sinusitis (chronic) 11/04/1999       Past Surgical History:    Past Surgical History:   Procedure Laterality Date     APPENDECTOMY       CHOLECYSTECTOMY       COLONOSCOPY  07-    Repeat 10 years     COLONOSCOPY  2012     GYN SURGERY       LIGATE VEIN VARICOSE, PHLEBECTOMY MULTIPLE STAB, COMBINED  4/16/2014    Procedure: BILATERAL ANTERIOR AND POSTERIOR STAB PHLEBECTOMY, EXCISION SOFT TISSUE MASS RIGHT LOWER BUTTOCK;  Surgeon: Lara Galeano DO;  Location: [facility] OR     ORTHOPEDIC SURGERY         Family History:    Family History   Problem Relation Age of Onset     Thyroid Disease Mother      Prostate Cancer Father      Cancer Father      cancer of bone       Social History:  Marital Status:    [2]  Social History   Substance Use Topics     Smoking status: Never Smoker     Smokeless tobacco: Never Used     Alcohol use No        Medications:      cephALEXin (KEFLEX) 500 MG capsule   citalopram (CELEXA) 20 MG tablet   [START ON 6/21/2018] conjugated estrogens (PREMARIN) cream   esomeprazole (NEXIUM) 40 MG CR capsule   phenazopyridine HCl (AZO URINARY PAIN RELIEF) tablet   SYNTHROID 112 MCG tablet   albuterol (ALBUTEROL) 108 (90 BASE) MCG/ACT inhaler   budesonide-formoterol (SYMBICORT) 160-4.5 MCG/ACT inhaler   ipratropium - albuterol 0.5 mg/2.5 mg/3 mL (DUONEB) 0.5-2.5 (3) MG/3ML nebulization   ORDER FOR DME         Review of Systems   Constitutional: Negative for chills, diaphoresis and fever.   HENT: Negative for voice change.    Eyes: Negative for visual disturbance.   Respiratory: Negative for cough, chest tightness, shortness of breath and wheezing.    Cardiovascular: Negative for chest pain, palpitations and leg swelling.   Gastrointestinal: Positive for abdominal pain. Negative for abdominal distention, anal bleeding, blood in stool, nausea and vomiting.   Genitourinary: Positive for dysuria, urgency and vaginal pain. Negative for decreased urine volume, flank pain, hematuria, menstrual problem, pelvic pain, vaginal bleeding and vaginal discharge.   Musculoskeletal: Negative for arthralgias, back pain, gait problem, myalgias, neck pain and neck stiffness.   Skin: Negative for color change, pallor, rash and wound.   Neurological: Negative for dizziness, syncope, light-headedness, numbness and headaches.   Psychiatric/Behavioral: Negative for confusion and suicidal ideas.       Physical Exam   BP: 170/73  Heart Rate: 76  Temp: 96.4  F (35.8  C)  Resp: 16  SpO2: 94 %      Physical Exam   Constitutional: She is oriented to person, place, and time. She appears well-developed and well-nourished.   HENT:   Head: Normocephalic and atraumatic.   Mouth/Throat: No oropharyngeal exudate.   Eyes:  Conjunctivae are normal. Pupils are equal, round, and reactive to light.   Neck: Normal range of motion. Neck supple. No JVD present. No tracheal deviation present. No thyromegaly present.   Cardiovascular: Normal rate, regular rhythm, normal heart sounds and intact distal pulses.  Exam reveals no gallop and no friction rub.    No murmur heard.  Pulmonary/Chest: Effort normal and breath sounds normal. No stridor. No respiratory distress. She has no wheezes. She has no rales. She exhibits no tenderness.   Abdominal: Soft. Bowel sounds are normal. She exhibits no distension and no mass. There is tenderness in the suprapubic area. There is no rebound and no guarding.   Musculoskeletal: Normal range of motion. She exhibits no edema or tenderness.   Lymphadenopathy:     She has no cervical adenopathy.   Neurological: She is alert and oriented to person, place, and time.   Skin: Skin is warm and dry. No rash noted. No erythema. No pallor.   Psychiatric: Her behavior is normal.   Nursing note and vitals reviewed.      ED Course     ED Course     Procedures                   Results for orders placed or performed during the hospital encounter of 06/19/18 (from the past 24 hour(s))   CBC with platelets differential   Result Value Ref Range    WBC 7.7 4.0 - 11.0 10e9/L    RBC Count 4.58 3.8 - 5.2 10e12/L    Hemoglobin 13.5 11.7 - 15.7 g/dL    Hematocrit 38.8 35.0 - 47.0 %    MCV 85 78 - 100 fl    MCH 29.5 26.5 - 33.0 pg    MCHC 34.8 31.5 - 36.5 g/dL    RDW 11.7 10.0 - 15.0 %    Platelet Count 268 150 - 450 10e9/L    Diff Method Automated Method     % Neutrophils 57.6 %    % Lymphocytes 32.1 %    % Monocytes 7.1 %    % Eosinophils 2.2 %    % Basophils 0.6 %    % Immature Granulocytes 0.4 %    Nucleated RBCs 0 0 /100    Absolute Neutrophil 4.5 1.6 - 8.3 10e9/L    Absolute Lymphocytes 2.5 0.8 - 5.3 10e9/L    Absolute Monocytes 0.6 0.0 - 1.3 10e9/L    Absolute Eosinophils 0.2 0.0 - 0.7 10e9/L    Absolute Basophils 0.1 0.0 - 0.2  10e9/L    Abs Immature Granulocytes 0.0 0 - 0.4 10e9/L    Absolute Nucleated RBC 0.0    Comprehensive metabolic panel   Result Value Ref Range    Sodium 135 133 - 144 mmol/L    Potassium 3.9 3.4 - 5.3 mmol/L    Chloride 101 94 - 109 mmol/L    Carbon Dioxide 25 20 - 32 mmol/L    Anion Gap 9 3 - 14 mmol/L    Glucose 95 70 - 99 mg/dL    Urea Nitrogen 15 7 - 30 mg/dL    Creatinine 0.68 0.52 - 1.04 mg/dL    GFR Estimate 84 >60 mL/min/1.7m2    GFR Estimate If Black >90 >60 mL/min/1.7m2    Calcium 8.8 8.5 - 10.1 mg/dL    Bilirubin Total 0.3 0.2 - 1.3 mg/dL    Albumin 3.6 3.4 - 5.0 g/dL    Protein Total 7.1 6.8 - 8.8 g/dL    Alkaline Phosphatase 83 40 - 150 U/L    ALT 21 0 - 50 U/L    AST 16 0 - 45 U/L   CRP inflammation   Result Value Ref Range    CRP Inflammation 6.8 0.0 - 8.0 mg/L   UA with Microscopic reflex to Culture   Result Value Ref Range    Color Urine Straw     Appearance Urine Clear     Glucose Urine Negative NEG^Negative mg/dL    Bilirubin Urine Negative NEG^Negative    Ketones Urine Negative NEG^Negative mg/dL    Specific Gravity Urine 1.003 1.003 - 1.035    Blood Urine Negative NEG^Negative    pH Urine 6.0 4.7 - 8.0 pH    Protein Albumin Urine Negative NEG^Negative mg/dL    Urobilinogen mg/dL Normal 0.0 - 2.0 mg/dL    Nitrite Urine Negative NEG^Negative    Leukocyte Esterase Urine Trace (A) NEG^Negative    Source Midstream Urine     WBC Urine <1 0 - 5 /HPF    RBC Urine <1 0 - 2 /HPF    Bacteria Urine None (A) NEG^Negative /HPF    Squamous Epithelial /HPF Urine 1 0 - 1 /HPF    Hyaline Casts 1 (A) OTO2^O - 2 /LPF       Medications   phenazopyridine (PYRIDIUM) tablet 200 mg (200 mg Oral Given 6/19/18 2322)   cephALEXin (KEFLEX) capsule 500 mg (500 mg Oral Given 6/19/18 2324)       Assessments & Plan (with Medical Decision Making)   Urinary symptoms with suprapubic tendernss  Also complaining vaginal dryness and burning sensation  UA: non conclusive, but as patient has typical cystitis symptoms , treatment for  UTI started  I advised her to return to ER if symptoms persisted, had fever vomiting or any unusual symptoms   She undrestood and agreed  I have reviewed the nursing notes.    I have reviewed the findings, diagnosis, plan and need for follow up with the patient.      Discharge Medication List as of 6/19/2018 11:25 PM      START taking these medications    Details   cephALEXin (KEFLEX) 500 MG capsule Take 1 capsule (500 mg) by mouth 3 times daily for 5 days, Disp-15 capsule, R-0, Local Print      conjugated estrogens (PREMARIN) cream Place 0.5 g vaginally twice a week, Disp-30 g, R-0, Local Print      phenazopyridine HCl (AZO URINARY PAIN RELIEF) tablet Take 1 tablet (95 mg) by mouth 3 times daily for 3 days, Disp-8 tablet, R-0, Local Print             Final diagnoses:   Atrophic vaginitis   Acute cystitis without hematuria       6/19/2018   [facility]     Miguel Diaz MD  06/20/18 [id number]

## 2018-06-20 NOTE — DISCHARGE INSTRUCTIONS
Atrophic Vaginitis    Atrophic vaginitis means the walls of your vagina have become thin. This happens when your body makes too little of the hormone estrogen. Menopause or surgical removal of the ovaries are the most common causes for a drop in estrogen. Breastfeeding can also cause the hormone level to drop.  Symptoms of atrophic vaginitis include:    Dryness, soreness, burning, or itching in the vagina    Vaginal discharge  Sex can be uncomfortable, even painful. After sex, you may have bleeding from your vagina. You may also have burning or pain when you urinate.  Home care  Your healthcare provider may recommend 1 or more of these as treatment:    Vaginal creams, lotions, and lubricants. These products help relieve vaginal dryness. They don t need a prescription. They can be found in the personal care section of most pharmacies. Creams and lotions are used daily to help keep the vagina moist. Lubricants help reduce dryness and pain during sex. Choose water-based lubricants. Don t use petroleum jelly, mineral oil, or other oils. These increase the chance of infection.     Hormone therapy (HT). HT increases the amount of estrogen in your body. This can help manage or relieve symptoms. HT can be given in pill form. It may be given as a lotion, cream, ring put into the vagina, or a patch on the skin. The risks and benefits of HT vary for each woman. For instance, your risk may be higher if you have had breast cancer. Discuss this treatment with your healthcare provider. Not every woman can use HT.  You don t need to give up (abstain from) sex. In fact, regular sex can help keep vaginal tissues healthy. Take steps to make sex more comfortable by using water-based lubricants.  Preventing infections  Atrophic vaginitis makes an infection of the vagina or the urinary tract more likely. To help reduce your risk:    Keep your genitals clean. When you bathe, wash the outside of your vagina with mild soap and water.  "Clean gently between the folds of your vagina.    Wipe from front to back after a bowel movement.    Don t douche unless your healthcare provider tells you to.    Avoid scented toilet paper, scented vaginal sprays, and scented tampons.    Avoid wearing clothes that are tight in the crotch. These include pantyhose, jeans, and leggings. Wear cotton underwear. Change it every day.  Follow-up care  Follow up with your healthcare provider, or as advised.  When to seek medical advice  Call your health care provider right away if any of these occur:    Fever of 100.4 F (38 C) or higher, or as directed by your healthcare provider    Symptoms don t go away or get worse even with treatment    Vaginal area swells or becomes painful    Vaginal area bleeds, but not because of your period    Bad-smelling discharge from the vagina    Pain or burning when you urinate or you have trouble passing urine    Open sores develop around vagina   Date Last Reviewed: 12/1/2016 2000-2017 The "[employer]. [address] [place] [address]. All rights reserved. This information is not intended as a substitute for professional medical care. Always follow your healthcare professional's instructions.         * BLADDER INFECTION,Female (Adult)    A bladder infection (\"cystitis\" or \"UTI\") usually causes a constant urge to urinate and a burning when passing urine. Urine may be cloudy, smelly or dark. There may be pain in the lower abdomen. A bladder infection occurs when bacteria from the vaginal area enter the bladder opening (urethra). This can occur from sexual intercourse, wearing tight clothing, dehydration and other factors.  HOME CARE:  1. Drink lots of fluids (at least 6-8 glasses a day, unless you must restrict fluids for other medical reasons). This will force the medicine into your urinary system and flush the bacteria out of your body. Cranberry juice has been shown to help clear out the bacteria.  2. Avoid sexual " intercourse until your symptoms are gone.  3. A bladder infection is treated with antibiotics. You may also be given Pyridium (generic = phenazopyridine) to reduce the burning sensation. This medicine will cause your urine to become a bright orange color. The orange urine may stain clothing. You may wear a pad or panty-liner to protect clothing.  PREVENTING FUTURE INFECTIONS:  1. Always wipe from front to back after a bowel movement.  2. Keep the genital area clean and dry.  3. Drink plenty of fluids each day to avoid dehydration.  4. Urinate right after intercourse to flush out the bladder.  5. Wear cotton underwear and cotton-lined panty hose; avoid tight-fitting pants.  6. If you are on birth control pills and are having frequent bladder infections, discuss with your doctor.  FOLLOW UP: Return to this facility or see your doctor if ALL symptoms are not gone after three days of treatment.  GET PROMPT MEDICAL ATTENTION if any of the following occur:    Fever over 101 F (38.3 C)    No improvement by the third day of treatment    Increasing back or abdominal pain    Repeated vomiting; unable to keep medicine down    Weakness, dizziness or fainting    Vaginal discharge    Pain, redness or swelling in the labia (outer vaginal area)    2506-8156 [employer]. [address] [place] [address], [place] [address]. All rights reserved. This information is not intended as a substitute for professional medical care. Always follow your healthcare professional's instructions.  This information has been modified by your health care provider with permission from the publisher.

## 2018-06-22 LAB
BACTERIA SPEC CULT: NO GROWTH
SPECIMEN SOURCE: NORMAL

## 2019-02-15 ENCOUNTER — TRANSFERRED RECORDS (OUTPATIENT)
Dept: HEALTH INFORMATION MANAGEMENT | Facility: CLINIC | Age: 74
End: 2019-02-15

## 2019-03-01 ENCOUNTER — TELEPHONE (OUTPATIENT)
Dept: FAMILY MEDICINE | Facility: OTHER | Age: 74
End: 2019-03-01

## 2019-03-01 NOTE — TELEPHONE ENCOUNTER
Received results from [facility] for a dexa scan will fax back to [facility] to Roxana Ferreira to give results pt does not see Fercho any more  Pamela M Lechevalier LPN

## 2020-11-01 ENCOUNTER — HOSPITAL ENCOUNTER (EMERGENCY)
Facility: HOSPITAL | Age: 75
Discharge: HOME | End: 2020-11-01
Attending: PHYSICIAN ASSISTANT | Admitting: PHYSICIAN ASSISTANT
Payer: COMMERCIAL

## 2020-11-01 ENCOUNTER — APPOINTMENT (OUTPATIENT)
Dept: GENERAL RADIOLOGY | Facility: HOSPITAL | Age: 75
End: 2020-11-01
Attending: PHYSICIAN ASSISTANT
Payer: COMMERCIAL

## 2020-11-01 VITALS
SYSTOLIC BLOOD PRESSURE: 157 MMHG | OXYGEN SATURATION: 96 % | TEMPERATURE: 97.9 F | RESPIRATION RATE: 16 BRPM | DIASTOLIC BLOOD PRESSURE: 75 MMHG | HEART RATE: 77 BPM

## 2020-11-01 DIAGNOSIS — M54.42 CHRONIC BILATERAL LOW BACK PAIN WITH LEFT-SIDED SCIATICA: Primary | ICD-10-CM

## 2020-11-01 DIAGNOSIS — G89.29 CHRONIC BILATERAL LOW BACK PAIN WITH LEFT-SIDED SCIATICA: Primary | ICD-10-CM

## 2020-11-01 LAB
ALBUMIN SERPL-MCNC: 3.6 G/DL (ref 3.4–5)
ALP SERPL-CCNC: 87 U/L (ref 40–150)
ALT SERPL W P-5'-P-CCNC: 16 U/L (ref 0–50)
ANION GAP SERPL CALCULATED.3IONS-SCNC: 7 MMOL/L (ref 3–14)
AST SERPL W P-5'-P-CCNC: 12 U/L (ref 0–45)
BASOPHILS # BLD AUTO: 0.1 10E9/L (ref 0–0.2)
BASOPHILS NFR BLD AUTO: 0.6 %
BILIRUB SERPL-MCNC: 0.2 MG/DL (ref 0.2–1.3)
BUN SERPL-MCNC: 17 MG/DL (ref 7–30)
CALCIUM SERPL-MCNC: 8.9 MG/DL (ref 8.5–10.1)
CHLORIDE SERPL-SCNC: 105 MMOL/L (ref 94–109)
CO2 SERPL-SCNC: 25 MMOL/L (ref 20–32)
CREAT SERPL-MCNC: 0.68 MG/DL (ref 0.52–1.04)
DIFFERENTIAL METHOD BLD: NORMAL
EOSINOPHIL # BLD AUTO: 0.2 10E9/L (ref 0–0.7)
EOSINOPHIL NFR BLD AUTO: 1.9 %
ERYTHROCYTE [DISTWIDTH] IN BLOOD BY AUTOMATED COUNT: 12.2 % (ref 10–15)
GFR SERPL CREATININE-BSD FRML MDRD: 85 ML/MIN/{1.73_M2}
GLUCOSE SERPL-MCNC: 92 MG/DL (ref 70–99)
HCT VFR BLD AUTO: 39.4 % (ref 35–47)
HGB BLD-MCNC: 13.3 G/DL (ref 11.7–15.7)
IMM GRANULOCYTES # BLD: 0 10E9/L (ref 0–0.4)
IMM GRANULOCYTES NFR BLD: 0.5 %
LYMPHOCYTES # BLD AUTO: 2.7 10E9/L (ref 0.8–5.3)
LYMPHOCYTES NFR BLD AUTO: 33.7 %
MCH RBC QN AUTO: 28.7 PG (ref 26.5–33)
MCHC RBC AUTO-ENTMCNC: 33.8 G/DL (ref 31.5–36.5)
MCV RBC AUTO: 85 FL (ref 78–100)
MONOCYTES # BLD AUTO: 0.7 10E9/L (ref 0–1.3)
MONOCYTES NFR BLD AUTO: 8.3 %
NEUTROPHILS # BLD AUTO: 4.4 10E9/L (ref 1.6–8.3)
NEUTROPHILS NFR BLD AUTO: 55 %
NRBC # BLD AUTO: 0 10*3/UL
NRBC BLD AUTO-RTO: 0 /100
PLATELET # BLD AUTO: 278 10E9/L (ref 150–450)
POTASSIUM SERPL-SCNC: 4 MMOL/L (ref 3.4–5.3)
PROT SERPL-MCNC: 7.5 G/DL (ref 6.8–8.8)
RBC # BLD AUTO: 4.63 10E12/L (ref 3.8–5.2)
SODIUM SERPL-SCNC: 137 MMOL/L (ref 133–144)
WBC # BLD AUTO: 8 10E9/L (ref 4–11)

## 2020-11-01 PROCEDURE — 36415 COLL VENOUS BLD VENIPUNCTURE: CPT | Performed by: PHYSICIAN ASSISTANT

## 2020-11-01 PROCEDURE — 72100 X-RAY EXAM L-S SPINE 2/3 VWS: CPT

## 2020-11-01 PROCEDURE — 85025 COMPLETE CBC W/AUTO DIFF WBC: CPT | Performed by: PHYSICIAN ASSISTANT

## 2020-11-01 PROCEDURE — G0463 HOSPITAL OUTPT CLINIC VISIT: HCPCS

## 2020-11-01 PROCEDURE — 99213 OFFICE O/P EST LOW 20 MIN: CPT | Performed by: PHYSICIAN ASSISTANT

## 2020-11-01 PROCEDURE — 80053 COMPREHEN METABOLIC PANEL: CPT | Performed by: PHYSICIAN ASSISTANT

## 2020-11-01 ASSESSMENT — ENCOUNTER SYMPTOMS
FREQUENCY: 0
WEAKNESS: 0
DYSURIA: 1
DIFFICULTY URINATING: 0
LIGHT-HEADEDNESS: 0
BACK PAIN: 1
CHILLS: 0
BLOOD IN STOOL: 0
SORE THROAT: 0
VOMITING: 0
ABDOMINAL PAIN: 0
NUMBNESS: 0
CHEST TIGHTNESS: 0
MYALGIAS: 1
FEVER: 0
PHOTOPHOBIA: 0
SHORTNESS OF BREATH: 0
FATIGUE: 0
NECK STIFFNESS: 0
HEADACHES: 0
DIZZINESS: 0

## 2020-11-01 NOTE — DISCHARGE INSTRUCTIONS
Thank you for allowing the urgent care to participate in your care. Please refer to your AVS for follow up and pain/symptoms management recommendations (I.e.: medications, helpful conservative treatment modalities, appropriate follow up if need to a specialist or family practice, etc.). Please return to urgent care if your symptoms change or worsen.     Discharge instructions:  -If you were prescribed a medication(s), please take this as prescribed/directed  -Monitor your symptoms, if changing/worsening, return to UC/ER or PCP for follow up    Back Pain/Strain - you were seen in the urgent care today for back pain - your AVS will contain additional information as well in regards to your diagnosis.   -For back pain, we recommend alternating tylenol and Ibuprofen as needed (if you are able to take this medications the recommendation is to alternate every 4 hours as needed. I.e.: Ibuprofen at 8am, Tylenol 12pm, Ibuprofen 4pm, etc.).    -Daily maximum of Tylenol is 4000mg (recommend staying under 3000mg)   -Daily maximum of Ibuprofen is 1200mg (take no more than six 200mg pills a day)  -You may have been placed on a muscle relaxer and/or steroid as well, take these as directed.   -Rest and heat are recommended. You may also try topical Capsaicin - may get hot, so test it on a smaller area of skin  - Be mindful of lifting and posture.  - As soon as tolerated, stretch.  - Consider chiropractor, massage, acupuncture.    * Most back pain will go away on it's own in 6-8 weeks. Follow up with family practice with persistent symptoms in 7-10 days.   ** Must be rechecked with: numbness in groin, loss of bowel or bladder function.     We will call with lab results

## 2020-11-01 NOTE — ED PROVIDER NOTES
"  History     Chief Complaint   Patient presents with     Back Pain     HPI  Na Vogel is a 74 year old female who resents to urgent care today with a chief complaint of midline lumbar spine pain.  She does have a history of low back pain in the recent and remote past however, \"this feels different and feels more internal\".  Back pain has been present for approximately 2 days and progressively worsening. Pain feels like: prickly burning over lower abdomen and low back (lumbar pain). Feels she gets real warm and breaks out in a sweat x 2-days.  Denies any falls, injuries and/or trauma to worsen her pain or cause recent flareup. Denies fevers. Denies change in bowel or bladder. Tingling in toes at night - worse at night - present for years, no changes in this. Denies CP, SOB, cough, NV.     Acute Non-Specific LBP (1-6 days from onset) Medication Information: OTC Analgesia:  Acetaminophen last dose - none today. Ice/heat - minimal relief. No muscle relaxers.   Has seen chiropractor in the past (years) and IR. Last shot/injection 3-years ago, some relief with injections, not always.     Denies bladder habit changes. Had diarrhea yesterday. No diarrhea today.  Does have chronic off and on diarrhea, no diagnosis of ulcerative colitis, Crohn's or IBS known.    Imaging: last MRI and XR in 2016.     No fevers, weight loss, bowel / bladder incontinence or localized weakness.    No changes to past medical, surgical, allergy medication list, these were all reviewed with patient.    Patient's PCP is Dr. Roxana Ferreira MD. Next visit with PCP on 11/9/2020.     Allergies:  Allergies   Allergen Reactions     Aspirin Other (See Comments)     abd pain  cramps     Moxifloxacin Hcl [Avelox] Hives     Ibuprofen Sodium Rash     Problem List:    Patient Active Problem List    Diagnosis Date Noted     Midline low back pain without sciatica 08/29/2016     Priority: Medium     COPD (chronic obstructive pulmonary disease) (H) " 03/21/2014     Priority: Medium     Mild persistent asthma 01/13/2014     Priority: Medium     Varicose veins of lower extremities with complications 11/11/2013     Priority: Medium     Venous (peripheral) insufficiency 11/11/2013     Priority: Medium     Symptomatic reticular veins 11/11/2013     Priority: Medium     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     Priority: Medium     Rosacea 08/29/2013     Priority: Medium     Hyperlipidemia LDL goal <100 07/25/2013     Priority: Medium     Acquired hypothyroidism      Priority: Medium     Gastroesophageal reflux disease      Priority: Medium      Past Medical History:    Past Medical History:   Diagnosis Date     Acute pharyngitis 02/24/2000     Acute sinusitis, unspecified 11/10/2000     Acute upper respiratory infections of unspecified 09/29/2005     Bronchitis, not specified as acute or chronic 01/07/2002     Cholecystitis, unspecified 03/06/2001     Dizziness and giddiness 10/29/2001     Dysuria 02/17/2005     Esophageal reflux 11/26/2002     Follow-up examination, following other surgery 08/01/2002     Follow-up examination, following unspecified surge 03/22/2001     Gastro-oesophageal reflux disease      Giant cell arteritis (H) 06/28/2000     Hiatal hernia      Midline low back pain without sciatica 8/29/2016     Mild persistent asthma 1/13/2014     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     Myalgia and myositis, unspecified 06/28//2000     Need for prophylactic vaccination and inoculation,Need for prophylactic vaccination and inoculation, 10/22/2004     Osteoarthrosis, unspecified whether generalized or 06/18/2002     Other abnormal findings on radiological examinatio 04/19/2002     Other and unspecified hyperlipidemia 01/23/2001     Other malaise and fatigue 11/12/2004     Other screening mammogram 11/27/2002     Other specified pre-operative examination 07/20/2005     Pain in joint involving lower leg 06/07/2002     Pain in joint, site  unspecified 03/02/2000     Pure hypercholesterolemia 07/22/2003     Rosacea 8/29/2013     Routine [facility] medical examination at [facility] 04/15/2002     Thyroid disease      Unspecified acquired hypothyroidism 11/22/2002     Unspecified hypertrophic and atrophic conditions o 11/09/1999     Unspecified otitis media 09/25/2007     Unspecified sinusitis (chronic) 11/04/1999     Past Surgical History:    Past Surgical History:   Procedure Laterality Date     APPENDECTOMY       CHOLECYSTECTOMY       COLONOSCOPY  07-    Repeat 10 years     COLONOSCOPY  2012     GYN SURGERY       LIGATE VEIN VARICOSE, PHLEBECTOMY MULTIPLE STAB, COMBINED  4/16/2014    Procedure: BILATERAL ANTERIOR AND POSTERIOR STAB PHLEBECTOMY, EXCISION SOFT TISSUE MASS RIGHT LOWER BUTTOCK;  Surgeon: Lara Galeano DO;  Location: [facility] OR     ORTHOPEDIC SURGERY       Family History:    Family History   Problem Relation Age of Onset     Thyroid Disease Mother      Prostate Cancer Father      Cancer Father         cancer of bone     Social History:  Marital Status:   [2]  Social History     Tobacco Use     Smoking status: Never Smoker     Smokeless tobacco: Never Used   Substance Use Topics     Alcohol use: No     Drug use: No      Medications:         albuterol (ALBUTEROL) 108 (90 BASE) MCG/ACT inhaler       budesonide-formoterol (SYMBICORT) 160-4.5 MCG/ACT inhaler       ipratropium - albuterol 0.5 mg/2.5 mg/3 mL (DUONEB) 0.5-2.5 (3) MG/3ML nebulization       SYNTHROID 112 MCG tablet       citalopram (CELEXA) 20 MG tablet       conjugated estrogens (PREMARIN) cream       esomeprazole (NEXIUM) 40 MG CR capsule       ORDER FOR DME      Review of Systems   Constitutional: Negative for chills, fatigue and fever.   HENT: Negative for sore throat.    Eyes: Negative for photophobia.   Respiratory: Negative for chest tightness and shortness of breath.    Gastrointestinal: Negative for abdominal pain, blood in stool and vomiting.   Genitourinary:  Positive for dysuria. Negative for difficulty urinating, frequency and urgency.   Musculoskeletal: Positive for back pain and myalgias. Negative for neck stiffness.   Skin: Negative for pallor.   Neurological: Negative for dizziness, weakness, light-headedness, numbness and headaches.   All other systems reviewed and are negative.    Physical Exam   BP: 157/75  Pulse: 77  Temp: 97.9  F (36.6  C)  Resp: 16  SpO2: 96 %    Physical Exam  Vitals signs and nursing note reviewed.   HENT:      Head: Normocephalic and atraumatic.   Eyes:      Conjunctiva/sclera: Conjunctivae normal.   Neck:      Musculoskeletal: Normal range of motion.   Cardiovascular:      Rate and Rhythm: Normal rate and regular rhythm.      Heart sounds: Normal heart sounds.   Pulmonary:      Effort: Pulmonary effort is normal.      Breath sounds: Normal breath sounds.   Abdominal:      General: Abdomen is flat. Bowel sounds are normal.      Palpations: Abdomen is soft.      Tenderness: There is abdominal tenderness in the left lower quadrant. There is no right CVA tenderness, left CVA tenderness, guarding or rebound. Negative signs include Sanchez's sign, Rovsing's sign, McBurney's sign, psoas sign and obturator sign.   Skin:     General: Skin is warm and dry.      Capillary Refill: Capillary refill takes less than 2 seconds.   Neurological:      General: No focal deficit present.      Mental Status: She is alert and oriented to person, place, and time.   Psychiatric:         Mood and Affect: Mood normal.         Behavior: Behavior normal.         Thought Content: Thought content normal.         Judgment: Judgment normal.       Thoracic/Lumbar Spine:  Inspection:    Lordosis: Normal   Kyphosis: Normal  Tenderness: lumbar spinous processes, left para lumbar muscles, right para lumbar muscles, left SI joint, right SI joint  ROM: left lateral thoracic bending   full, right lateral thoracic bending  full, left thoracic rotation  full, right thoracic  rotation  full, lumbar flexion  full, lumbar extension  full, painful, left lateral lumbar bending  full, right lateral lumbar bending  full, left lateral lumbar rotation  full, pain-free, right lateral lumbar rotation  full, painful  Special Test: positive left straight leg raise, positive right straight leg raise, positive SI joint compression  Awake, alert, oriented to name, place and time.  Cranial nerves II-XII are grossly intact.  Motor is 5 out of 5 bilaterally.      Patient knows own name, what time of day it is and what building we are in. CNII-XII intact.  Completes palm-dorsal hand slap bilaterally. Can complete nose to finger testing. Gets in and out of chair unassisted. No pronator drift. Sensation at UE and LE intact to light touch.     ED Course        Procedures                 Results for orders placed or performed during the hospital encounter of 11/01/20 (from the past 24 hour(s))   Lumbar spine XR, 2-3 views    Narrative    PROCEDURE: XR LUMBAR SPINE 2-3 VIEWS 11/1/2020 5:17 PM    HISTORY: back pain worsening    COMPARISONS: None.    TECHNIQUE: AP and lateral    FINDINGS: There is lumbar scoliosis concave right. There is gaseous  degeneration of the L5-S1 disc with mild nonspondylolytic  spondylolisthesis. Advanced lower lumbar facet joint degenerative  changes are noted. There is some vertebral body elongation and mild  wedging of the lower thoracic and L1 vertebra consistent with  Scheuermann's disease.         Impression    IMPRESSION: Lower lumbar facet joint degenerative changes with mild  nonspondylolytic spondylolisthesis of L5 on S1    ALEX XIONG MD   CBC with platelets differential   Result Value Ref Range    WBC 8.0 4.0 - 11.0 10e9/L    RBC Count 4.63 3.8 - 5.2 10e12/L    Hemoglobin 13.3 11.7 - 15.7 g/dL    Hematocrit 39.4 35.0 - 47.0 %    MCV 85 78 - 100 fl    MCH 28.7 26.5 - 33.0 pg    MCHC 33.8 31.5 - 36.5 g/dL    RDW 12.2 10.0 - 15.0 %    Platelet Count 278 150 - 450 10e9/L     Diff Method Automated Method     % Neutrophils 55.0 %    % Lymphocytes 33.7 %    % Monocytes 8.3 %    % Eosinophils 1.9 %    % Basophils 0.6 %    % Immature Granulocytes 0.5 %    Nucleated RBCs 0 0 /100    Absolute Neutrophil 4.4 1.6 - 8.3 10e9/L    Absolute Lymphocytes 2.7 0.8 - 5.3 10e9/L    Absolute Monocytes 0.7 0.0 - 1.3 10e9/L    Absolute Eosinophils 0.2 0.0 - 0.7 10e9/L    Absolute Basophils 0.1 0.0 - 0.2 10e9/L    Abs Immature Granulocytes 0.0 0 - 0.4 10e9/L    Absolute Nucleated RBC 0.0    Comprehensive metabolic panel   Result Value Ref Range    Sodium 137 133 - 144 mmol/L    Potassium 4.0 3.4 - 5.3 mmol/L    Chloride 105 94 - 109 mmol/L    Carbon Dioxide 25 20 - 32 mmol/L    Anion Gap 7 3 - 14 mmol/L    Glucose 92 70 - 99 mg/dL    Urea Nitrogen 17 7 - 30 mg/dL    Creatinine 0.68 0.52 - 1.04 mg/dL    GFR Estimate 85 >60 mL/min/[1.73_m2]    GFR Estimate If Black >90 >60 mL/min/[1.73_m2]    Calcium 8.9 8.5 - 10.1 mg/dL    Bilirubin Total 0.2 0.2 - 1.3 mg/dL    Albumin 3.6 3.4 - 5.0 g/dL    Protein Total 7.5 6.8 - 8.8 g/dL    Alkaline Phosphatase 87 40 - 150 U/L    ALT 16 0 - 50 U/L    AST 12 0 - 45 U/L       Medications - No data to display    Assessments & Plan (with Medical Decision Making)     I have reviewed the nursing notes.    I have reviewed the findings, diagnosis, plan and need for follow up with the patient.  Discharge Medication List as of 11/1/2020  5:32 PM          Final diagnoses:   Chronic bilateral low back pain with left-sided sciatica   Patient is a pleasant, active 74-year-old female in no obvious distress.  She presents urgent care today with a chief complaint of low back pain which has persisted for approximately 2 to 3 days.  She does have a history of lumbar spine pathology however, she feels her current symptoms have flared up prompting her visit to urgent care today.  She denies any changes to bowel or bladder habits (no signs of cauda equina). No headaches, nausea, vomiting, chest  pain or shortness of breath.    Vital signs stable throughout the entirety of her urgent care visit.  Physical exam is notable for bilateral straight leg raise positive testing and very mild left lower quadrant pain to palpation.  Bowel habits have normalized since yesterday.  An x-ray of the lumbar spine was ordered as patient's most recent imaging on x-ray and MRI was in 2016.  Repeat imaging was ordered as I suspect her DJD has advanced further since prior imaging.    X-ray results showed: [Degenerative disc disease with a convexivity to the left L3 and distally. There is no subluxation or fracture.  To assess for further endplate changes and neurologic changes, patient would benefit from an outpatient MRI.    Patient was educated on the anatomy, pathophysiology typical treatment plan based off her x-rays and physical exam.  I am recommending that she have an outpatient MRI scan ordered and possible neurosurgery referral if her pain persists and keeps her as much discomfort as she is currently having.  She is unable to take NSAIDs due to adverse side effects, I recommend she continue with Tylenol.  A short course of prednisone and a muscle relaxer may be beneficial as well, patient decided on dedication for an outpatient MRI was ordered.  CBC and CMP were both within normal limits (LPN reviewed lab results with patient after discharge as patient elected to go home prior to return of lab results), these were ordered due to patient feeling as she was ill.  No indications at this time to order CT abdomen pelvis as I felt her abdominal pain was primarily related to her recent change in GI habits.     She was educated that if bowel or bladder changes occur or worsening symptoms she should follow-up prior to her visit with her PCP on 11/9/2020.  All questions and concerns were answered to patient satisfaction.  She is in agreement understanding with above treatment plan.    Pau Lees PA-C  11/1/2020   [facility]  EMERGENCY [facility]PART[facility]    This document was prepared using a combination of typing and voice generated software.  While every attempt was made for accuracy, spelling and grammatical errors may exist.

## 2020-11-01 NOTE — ED TRIAGE NOTES
"Pt presents today because she has a pain in her lower back. \"has back issues too but this is different, feels more internal\" starting 2 days ago.   "

## 2020-11-01 NOTE — ED AVS SNAPSHOT
[facility]  [address]  [place] [address]  Phone: [phone]                                    Na Vogel   MRN: [id number]    Department: [facility]   Date of Visit: 11/1/2020           After Visit Summary Signature Page    I have received my discharge instructions, and my questions have been answered. I have discussed any challenges I see with this plan with the nurse or doctor.    ..........................................................................................................................................  Patient/Patient Representative Signature      ..........................................................................................................................................  Patient Representative Print Name and Relationship to Patient    ..................................................               ................................................  Date                                   Time    ..........................................................................................................................................  Reviewed by Signature/Title    ...................................................              ..............................................  Date                                               Time          22EPIC Rev 08/18

## 2020-11-02 NOTE — ED NOTES
Called pt and left a message asking for a call back. She called back and I let her know that all of her lab results were normal.

## 2020-11-06 ENCOUNTER — HOSPITAL ENCOUNTER (OUTPATIENT)
Dept: MRI IMAGING | Facility: HOSPITAL | Age: 75
Discharge: HOME | End: 2020-11-06
Attending: PHYSICIAN ASSISTANT | Admitting: PHYSICIAN ASSISTANT
Payer: COMMERCIAL

## 2020-11-06 PROCEDURE — 72148 MRI LUMBAR SPINE W/O DYE: CPT

## 2021-01-04 ENCOUNTER — HOSPITAL ENCOUNTER (EMERGENCY)
Facility: HOSPITAL | Age: 76
Discharge: HOME | End: 2021-01-04
Attending: PHYSICIAN ASSISTANT | Admitting: PHYSICIAN ASSISTANT
Payer: COMMERCIAL

## 2021-01-04 VITALS
TEMPERATURE: 96.2 F | OXYGEN SATURATION: 96 % | SYSTOLIC BLOOD PRESSURE: 135 MMHG | RESPIRATION RATE: 16 BRPM | DIASTOLIC BLOOD PRESSURE: 77 MMHG

## 2021-01-04 DIAGNOSIS — J32.0 RIGHT MAXILLARY SINUSITIS: ICD-10-CM

## 2021-01-04 PROCEDURE — 99213 OFFICE O/P EST LOW 20 MIN: CPT | Performed by: PHYSICIAN ASSISTANT

## 2021-01-04 PROCEDURE — G0463 HOSPITAL OUTPT CLINIC VISIT: HCPCS

## 2021-01-04 ASSESSMENT — ENCOUNTER SYMPTOMS
WHEEZING: 0
CHILLS: 0
FEVER: 0
GASTROINTESTINAL NEGATIVE: 1
SHORTNESS OF BREATH: 0
NEUROLOGICAL NEGATIVE: 1
SINUS PAIN: 1
SINUS PRESSURE: 1
EYES NEGATIVE: 1
CARDIOVASCULAR NEGATIVE: 1
SORE THROAT: 1
PSYCHIATRIC NEGATIVE: 1
PALPITATIONS: 0
FATIGUE: 1
CHOKING: 1

## 2021-01-04 NOTE — ED AVS SNAPSHOT
[facility]  [address]  [place] [address]  Phone: [phone]                                    aN Vogel   MRN: [id number]    Department: [facility]   Date of Visit: 1/4/2021           After Visit Summary Signature Page    I have received my discharge instructions, and my questions have been answered. I have discussed any challenges I see with this plan with the nurse or doctor.    ..........................................................................................................................................  Patient/Patient Representative Signature      ..........................................................................................................................................  Patient Representative Print Name and Relationship to Patient    ..................................................               ................................................  Date                                   Time    ..........................................................................................................................................  Reviewed by Signature/Title    ...................................................              ..............................................  Date                                               Time          22EPIC Rev 08/18

## 2021-01-04 NOTE — ED TRIAGE NOTES
Patient has had sinus issues over the last 4 days. Ears have started to hurt. Some low back pain since her cortisone shot

## 2021-01-04 NOTE — ED PROVIDER NOTES
History     Chief Complaint   Patient presents with     Sinusitis     HPI  Na Vogel is a 75 year old female with Hx COPD/asthma, chronic sinusitis who presents ambulatory to  c/o 5+ days of focal right sided facial pain, headaches, ear pain, and fatigue. She reports increase in cough and yellow, thick postnasal drainage. She is unsure of fevers. She denies shortness of breath, chest pain. Denies worst headache of life. Sx worsening, and in the past required Abx, prompting visit. Uses nasal steroid PRN, and not helping.     Allergies:  Allergies   Allergen Reactions     Aspirin Other (See Comments)     abd pain  cramps     Moxifloxacin Hcl [Avelox] Hives     Ibuprofen Sodium Rash       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     Priority: Medium     Rosacea 08/29/2013     Priority: Medium     Hyperlipidemia LDL goal <100 07/25/2013     Priority: Medium     Acquired hypothyroidism      Priority: Medium     Gastroesophageal reflux disease      Priority: Medium        Past Medical History:    Past Medical History:   Diagnosis Date     Acute pharyngitis 02/24/2000     Acute sinusitis, unspecified 11/10/2000     Acute upper respiratory infections of unspecified 09/29/2005     Bronchitis, not specified as acute or chronic 01/07/2002     Cholecystitis, unspecified 03/06/2001     Dizziness and giddiness 10/29/2001     Dysuria 02/17/2005     Esophageal reflux 11/26/2002     Follow-up  examination, following other surgery 08/01/2002     Follow-up examination, following unspecified surge 03/22/2001     Gastro-oesophageal reflux disease      Giant cell arteritis (H) 06/28/2000     Hiatal hernia      Midline low back pain without sciatica 8/29/2016     Mild persistent asthma 1/13/2014     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     Myalgia and myositis, unspecified 06/28//2000     Need for prophylactic vaccination and inoculation,Need for prophylactic vaccination and inoculation, 10/22/2004     Osteoarthrosis, unspecified whether generalized or 06/18/2002     Other abnormal findings on radiological examinatio 04/19/2002     Other and unspecified hyperlipidemia 01/23/2001     Other malaise and fatigue 11/12/2004     Other screening mammogram 11/27/2002     Other specified pre-operative examination 07/20/2005     Pain in joint involving lower leg 06/07/2002     Pain in joint, site unspecified 03/02/2000     Pure hypercholesterolemia 07/22/2003     Rosacea 8/29/2013     Routine general medical examination at [facility] 04/15/2002     Thyroid disease      Unspecified acquired hypothyroidism 11/22/2002     Unspecified hypertrophic and atrophic conditions o 11/09/1999     Unspecified otitis media 09/25/2007     Unspecified sinusitis (chronic) 11/04/1999       Past Surgical History:    Past Surgical History:   Procedure Laterality Date     APPENDECTOMY       CHOLECYSTECTOMY       COLONOSCOPY  07-    Repeat 10 years     COLONOSCOPY  2012     GYN SURGERY       LIGATE VEIN VARICOSE, PHLEBECTOMY MULTIPLE STAB, COMBINED  4/16/2014    Procedure: BILATERAL ANTERIOR AND POSTERIOR STAB PHLEBECTOMY, EXCISION SOFT TISSUE MASS RIGHT LOWER BUTTOCK;  Surgeon: Lara Galeano DO;  Location: [facility]     ORTHOPEDIC SURGERY         Family History:    Family History   Problem Relation Age of Onset     Thyroid Disease Mother      Prostate Cancer Father      Cancer Father         cancer of bone       Social  History:  Marital Status:   [5]  Social History     Tobacco Use     Smoking status: Never Smoker     Smokeless tobacco: Never Used   Substance Use Topics     Alcohol use: No     Drug use: No        Medications:         albuterol (ALBUTEROL) 108 (90 BASE) MCG/ACT inhaler       amoxicillin-clavulanate (AUGMENTIN) 875-125 MG tablet       citalopram (CELEXA) 20 MG tablet       esomeprazole (NEXIUM) 40 MG CR capsule       ipratropium - albuterol 0.5 mg/2.5 mg/3 mL (DUONEB) 0.5-2.5 (3) MG/3ML nebulization       ORDER FOR DME       SYNTHROID 112 MCG tablet       budesonide-formoterol (SYMBICORT) 160-4.5 MCG/ACT inhaler       conjugated estrogens (PREMARIN) cream          Review of Systems   Constitutional: Positive for fatigue. Negative for chills and fever.   HENT: Positive for congestion, ear pain, postnasal drip, sinus pressure, sinus pain and sore throat. Negative for ear discharge.    Eyes: Negative.    Respiratory: Positive for choking. Negative for shortness of breath and wheezing.    Cardiovascular: Negative.  Negative for chest pain, palpitations and leg swelling.   Gastrointestinal: Negative.    Skin: Negative.    Neurological: Negative.    Psychiatric/Behavioral: Negative.        Physical Exam   BP: 135/77  Temp: 96.2  F (35.7  C)  Resp: 16  SpO2: 96 %      Physical Exam  Vitals signs and nursing note reviewed.   Constitutional:       General: She is not in acute distress.     Appearance: She is well-developed.   HENT:      Head: Normocephalic and atraumatic.      Right Ear: External ear normal. A middle ear effusion is present. Tympanic membrane is injected.      Left Ear: External ear normal. A middle ear effusion is present.      Nose:      Right Sinus: Maxillary sinus tenderness present.      Mouth/Throat:      Mouth: Mucous membranes are moist.      Pharynx: Oropharynx is clear. Uvula midline. No oropharyngeal exudate.   Eyes:      Pupils: Pupils are equal, round, and reactive to light.    Cardiovascular:      Rate and Rhythm: Normal rate and regular rhythm.   Pulmonary:      Effort: Pulmonary effort is normal.      Breath sounds: Normal breath sounds. No wheezing or rales.   Lymphadenopathy:      Cervical: No cervical adenopathy.   Skin:     General: Skin is warm and dry.   Neurological:      Mental Status: She is alert and oriented to person, place, and time.   Psychiatric:         Mood and Affect: Mood normal.         ED Course        Procedures         No results found for this or any previous visit (from the past 24 hour(s)).    Medications - No data to display    Assessments & Plan (with Medical Decision Making)     I have reviewed the nursing notes.    I have reviewed the findings, diagnosis, plan and need for follow up with the patient.    Discharge Medication List as of 1/4/2021 10:15 AM      START taking these medications    Details   amoxicillin-clavulanate (AUGMENTIN) 875-125 MG tablet Take 1 tablet by mouth 2 times daily for 10 days, Disp-20 tablet, R-0, E-Prescribe             Final diagnoses:   Right maxillary sinusitis   Given right sided facial pain, Rt TM and Hx chronic sinus Sx - will treat with antibiotic as above. Discussed flonase, saline, neti pot, & gargles. Tylenol PRN for pain. F/U with PCP in 7-10 days, seeking attention sooner with worsening despite treatment. Patient agreeable to plan and discharged home stable.     1/4/2021   [facility]     John Martin PA  01/04/21 1144

## 2021-01-04 NOTE — DISCHARGE INSTRUCTIONS
Augmentin for 10 days to cover bacteria.  1.. Dry out congestion with flonase (1spray in both nostrils once daily for 3-5 days)   2. Use a saline spray/Neti Pot/sinus flush (Tam Med Sinus Rinse) 2-3 times daily to irrigate sinuses/mucosal tissue. This dilutes and moves secretions.   3. Tylenol or ibuprofen for pain and fevers as needed.   4. Plenty of fluids and rest as needed.   5. Chew, yawn and speak to help eustachian tubes drain.

## 2022-07-31 ENCOUNTER — HOSPITAL ENCOUNTER (EMERGENCY)
Facility: HOSPITAL | Age: 77
Discharge: HOME | End: 2022-07-31
Attending: NURSE PRACTITIONER | Admitting: NURSE PRACTITIONER
Payer: COMMERCIAL

## 2022-07-31 ENCOUNTER — APPOINTMENT (OUTPATIENT)
Dept: CT IMAGING | Facility: HOSPITAL | Age: 77
End: 2022-07-31
Attending: NURSE PRACTITIONER
Payer: COMMERCIAL

## 2022-07-31 VITALS
TEMPERATURE: 97.9 F | OXYGEN SATURATION: 97 % | DIASTOLIC BLOOD PRESSURE: 80 MMHG | RESPIRATION RATE: 20 BRPM | SYSTOLIC BLOOD PRESSURE: 172 MMHG | HEART RATE: 73 BPM

## 2022-07-31 DIAGNOSIS — R10.84 ABDOMINAL PAIN, GENERALIZED: Primary | ICD-10-CM

## 2022-07-31 DIAGNOSIS — R19.7 DIARRHEA: ICD-10-CM

## 2022-07-31 LAB
ALBUMIN SERPL-MCNC: 3.6 G/DL (ref 3.4–5)
ALBUMIN UR-MCNC: NEGATIVE MG/DL
ALP SERPL-CCNC: 77 U/L (ref 40–150)
ALT SERPL W P-5'-P-CCNC: 18 U/L (ref 0–50)
ANION GAP SERPL CALCULATED.3IONS-SCNC: 5 MMOL/L (ref 3–14)
APPEARANCE UR: CLEAR
AST SERPL W P-5'-P-CCNC: 14 U/L (ref 0–45)
BASOPHILS # BLD AUTO: 0 10E3/UL (ref 0–0.2)
BASOPHILS NFR BLD AUTO: 1 %
BILIRUB SERPL-MCNC: 0.5 MG/DL (ref 0.2–1.3)
BILIRUB UR QL STRIP: NEGATIVE
BUN SERPL-MCNC: 15 MG/DL (ref 7–30)
CALCIUM SERPL-MCNC: 9.2 MG/DL (ref 8.5–10.1)
CHLORIDE BLD-SCNC: 104 MMOL/L (ref 94–109)
CO2 SERPL-SCNC: 26 MMOL/L (ref 20–32)
COLOR UR AUTO: ABNORMAL
CREAT SERPL-MCNC: 0.6 MG/DL (ref 0.52–1.04)
EOSINOPHIL # BLD AUTO: 0.1 10E3/UL (ref 0–0.7)
EOSINOPHIL NFR BLD AUTO: 1 %
ERYTHROCYTE [DISTWIDTH] IN BLOOD BY AUTOMATED COUNT: 12 % (ref 10–15)
GFR SERPL CREATININE-BSD FRML MDRD: >90 ML/MIN/1.73M2
GLUCOSE BLD-MCNC: 94 MG/DL (ref 70–99)
GLUCOSE UR STRIP-MCNC: NEGATIVE MG/DL
HCT VFR BLD AUTO: 41.1 % (ref 35–47)
HGB BLD-MCNC: 14 G/DL (ref 11.7–15.7)
HGB UR QL STRIP: NEGATIVE
IMM GRANULOCYTES # BLD: 0 10E3/UL
IMM GRANULOCYTES NFR BLD: 0 %
KETONES UR STRIP-MCNC: ABNORMAL MG/DL
LEUKOCYTE ESTERASE UR QL STRIP: ABNORMAL
LIPASE SERPL-CCNC: 179 U/L (ref 73–393)
LYMPHOCYTES # BLD AUTO: 1.9 10E3/UL (ref 0.8–5.3)
LYMPHOCYTES NFR BLD AUTO: 26 %
MCH RBC QN AUTO: 29.6 PG (ref 26.5–33)
MCHC RBC AUTO-ENTMCNC: 34.1 G/DL (ref 31.5–36.5)
MCV RBC AUTO: 87 FL (ref 78–100)
MONOCYTES # BLD AUTO: 0.5 10E3/UL (ref 0–1.3)
MONOCYTES NFR BLD AUTO: 7 %
MUCOUS THREADS #/AREA URNS LPF: PRESENT /LPF
NEUTROPHILS # BLD AUTO: 4.7 10E3/UL (ref 1.6–8.3)
NEUTROPHILS NFR BLD AUTO: 65 %
NITRATE UR QL: NEGATIVE
NRBC # BLD AUTO: 0 10E3/UL
NRBC BLD AUTO-RTO: 0 /100
PH UR STRIP: 5.5 [PH] (ref 4.7–8)
PLATELET # BLD AUTO: 276 10E3/UL (ref 150–450)
POTASSIUM BLD-SCNC: 3.8 MMOL/L (ref 3.4–5.3)
PROT SERPL-MCNC: 7.4 G/DL (ref 6.8–8.8)
RBC # BLD AUTO: 4.73 10E6/UL (ref 3.8–5.2)
RBC URINE: 1 /HPF
SODIUM SERPL-SCNC: 135 MMOL/L (ref 133–144)
SP GR UR STRIP: 1.01 (ref 1–1.03)
SQUAMOUS EPITHELIAL: 3 /HPF
UROBILINOGEN UR STRIP-MCNC: NORMAL MG/DL
WBC # BLD AUTO: 7.2 10E3/UL (ref 4–11)
WBC URINE: 8 /HPF

## 2022-07-31 PROCEDURE — 99284 EMERGENCY DEPT VISIT MOD MDM: CPT | Performed by: NURSE PRACTITIONER

## 2022-07-31 PROCEDURE — 250N000013 HC RX MED GY IP 250 OP 250 PS 637: Performed by: NURSE PRACTITIONER

## 2022-07-31 PROCEDURE — 99285 EMERGENCY DEPT VISIT HI MDM: CPT | Mod: 25

## 2022-07-31 PROCEDURE — 85025 COMPLETE CBC W/AUTO DIFF WBC: CPT | Performed by: NURSE PRACTITIONER

## 2022-07-31 PROCEDURE — 83690 ASSAY OF LIPASE: CPT | Performed by: NURSE PRACTITIONER

## 2022-07-31 PROCEDURE — 74177 CT ABD & PELVIS W/CONTRAST: CPT

## 2022-07-31 PROCEDURE — 258N000003 HC RX IP 258 OP 636: Performed by: NURSE PRACTITIONER

## 2022-07-31 PROCEDURE — 36415 COLL VENOUS BLD VENIPUNCTURE: CPT | Performed by: NURSE PRACTITIONER

## 2022-07-31 PROCEDURE — 81001 URINALYSIS AUTO W/SCOPE: CPT | Performed by: NURSE PRACTITIONER

## 2022-07-31 PROCEDURE — 80053 COMPREHEN METABOLIC PANEL: CPT | Performed by: NURSE PRACTITIONER

## 2022-07-31 PROCEDURE — 250N000011 HC RX IP 250 OP 636: Performed by: NURSE PRACTITIONER

## 2022-07-31 PROCEDURE — 87086 URINE CULTURE/COLONY COUNT: CPT | Performed by: NURSE PRACTITIONER

## 2022-07-31 RX ORDER — SODIUM CHLORIDE 9 MG/ML
INJECTION, SOLUTION INTRAVENOUS CONTINUOUS
Status: DISCONTINUED | OUTPATIENT
Start: 2022-07-31 | End: 2022-07-31 | Stop reason: HOSPADM

## 2022-07-31 RX ORDER — ACETAMINOPHEN 325 MG/1
975 TABLET ORAL ONCE
Status: COMPLETED | OUTPATIENT
Start: 2022-07-31 | End: 2022-07-31

## 2022-07-31 RX ORDER — IOPAMIDOL 755 MG/ML
94 INJECTION, SOLUTION INTRAVASCULAR ONCE
Status: COMPLETED | OUTPATIENT
Start: 2022-07-31 | End: 2022-07-31

## 2022-07-31 RX ADMIN — IOPAMIDOL 94 ML: 755 INJECTION, SOLUTION INTRAVENOUS at 16:09

## 2022-07-31 RX ADMIN — SODIUM CHLORIDE 500 ML: 9 INJECTION, SOLUTION INTRAVENOUS at 16:25

## 2022-07-31 RX ADMIN — ACETAMINOPHEN 975 MG: 325 TABLET, FILM COATED ORAL at 17:32

## 2022-07-31 ASSESSMENT — ENCOUNTER SYMPTOMS
FREQUENCY: 0
CHILLS: 0
WEAKNESS: 0
BACK PAIN: 0
COUGH: 0
SHORTNESS OF BREATH: 0
HEMATURIA: 0
DIFFICULTY URINATING: 0
VOMITING: 0
BLOOD IN STOOL: 0
NAUSEA: 0
FLANK PAIN: 1
ANAL BLEEDING: 0
LIGHT-HEADEDNESS: 0
CONSTIPATION: 0
FATIGUE: 1
HEADACHES: 0
DIZZINESS: 0
DYSURIA: 0
DIARRHEA: 1
PALPITATIONS: 0
ABDOMINAL PAIN: 1
FEVER: 0

## 2022-07-31 NOTE — ED PROVIDER NOTES
History     Chief Complaint   Patient presents with     Abdominal Pain     HPI     Na Vogel is a 76 year old female with history of COPD, GERD, hyperlipidemia, hypothyroidism who presents ambulatory from home for ration of abdominal pain.  Abdominal pain started Friday morning.  Currently 7 out of 10, burning, sore, aching discomfort.  Nothing specific such as eating, voiding, bowel movement increases discomfort.  She did try acetaminophen which was not helpful for discomfort.  She also endorses history of hiatal hernia and symptoms of indigestion controlled with her PPI.  This does not feel like typical GERD symptoms.  She has associated fatigue.  She had diarrhea all day on Friday.  She had 1 episode of diarrhea yesterday.  She has had no diarrhea today.  She denies hematochezia, melena.  She denies any nausea or vomiting.  She denies any urinary symptoms.  She denies any chest pain, dyspnea.      Allergies:  Allergies   Allergen Reactions     Aspirin Other (See Comments)     abd pain  cramps     Moxifloxacin Hcl [Avelox] Hives     Ibuprofen Sodium Rash       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     Priority: Medium     Rosacea 08/29/2013     Priority: Medium     Hyperlipidemia LDL goal <100 07/25/2013     Priority: Medium     Acquired hypothyroidism      Priority: Medium     Gastroesophageal reflux disease      Priority: Medium        Past Medical History:    Past Medical History:    Diagnosis Date     Acute pharyngitis 02/24/2000     Acute sinusitis, unspecified 11/10/2000     Acute upper respiratory infections of unspecified 09/29/2005     Bronchitis, not specified as acute or chronic 01/07/2002     Cholecystitis, unspecified 03/06/2001     Dizziness and giddiness 10/29/2001     Dysuria 02/17/2005     Esophageal reflux 11/26/2002     Follow-up examination, following other surgery 08/01/2002     Follow-up examination, following unspecified surge 03/22/2001     Gastro-oesophageal reflux disease      Giant cell arteritis (H) 06/28/2000     Hiatal hernia      Midline low back pain without sciatica 8/29/2016     Mild persistent asthma 1/13/2014     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     Myalgia and myositis, unspecified 06/28//2000     Need for prophylactic vaccination and inoculation,Need for prophylactic vaccination and inoculation, 10/22/2004     Osteoarthrosis, unspecified whether generalized or 06/18/2002     Other abnormal findings on radiological examinatio 04/19/2002     Other and unspecified hyperlipidemia 01/23/2001     Other malaise and fatigue 11/12/2004     Other screening mammogram 11/27/2002     Other specified pre-operative examination 07/20/2005     Pain in joint involving lower leg 06/07/2002     Pain in joint, site unspecified 03/02/2000     Pure hypercholesterolemia 07/22/2003     Rosacea 8/29/2013     Routine general medical examination at [facility] 04/15/2002     Thyroid disease      Unspecified acquired hypothyroidism 11/22/2002     Unspecified hypertrophic and atrophic conditions o 11/09/1999     Unspecified otitis media 09/25/2007     Unspecified sinusitis (chronic) 11/04/1999       Past Surgical History:    Past Surgical History:   Procedure Laterality Date     APPENDECTOMY       CHOLECYSTECTOMY       COLONOSCOPY  07-    Repeat 10 years     COLONOSCOPY  2012     GYN SURGERY       LIGATE VEIN VARICOSE, PHLEBECTOMY MULTIPLE STAB, COMBINED  4/16/2014     Procedure: BILATERAL ANTERIOR AND POSTERIOR STAB PHLEBECTOMY, EXCISION SOFT TISSUE MASS RIGHT LOWER BUTTOCK;  Surgeon: Lara Galeano DO;  Location: [facility]     ORTHOPEDIC SURGERY         Family History:    Family History   Problem Relation Age of Onset     Thyroid Disease Mother      Prostate Cancer Father      Cancer Father         cancer of bone       Social History:  Marital Status:   [5]  Social History     Tobacco Use     Smoking status: Never Smoker     Smokeless tobacco: Never Used   Substance Use Topics     Alcohol use: No     Drug use: No        Medications:    albuterol (ALBUTEROL) 108 (90 BASE) MCG/ACT inhaler  budesonide-formoterol (SYMBICORT) 160-4.5 MCG/ACT inhaler  citalopram (CELEXA) 20 MG tablet  esomeprazole (NEXIUM) 40 MG CR capsule  ipratropium - albuterol 0.5 mg/2.5 mg/3 mL (DUONEB) 0.5-2.5 (3) MG/3ML nebulization  SYNTHROID 112 MCG tablet  ORDER FOR DME          Review of Systems   Constitutional: Positive for fatigue. Negative for chills and fever.   Respiratory: Negative for cough and shortness of breath.    Cardiovascular: Negative for chest pain, palpitations and leg swelling.   Gastrointestinal: Positive for abdominal pain and diarrhea. Negative for anal bleeding, blood in stool, constipation, nausea and vomiting.   Genitourinary: Positive for flank pain (right). Negative for difficulty urinating, dysuria, frequency, hematuria and urgency.   Musculoskeletal: Negative for back pain.   Skin: Negative for rash.   Neurological: Negative for dizziness, weakness, light-headedness and headaches.       Physical Exam   BP: 167/81  Pulse: 78  Temp: 97.6  F (36.4  C)  Resp: 20  SpO2: 95 %      Physical Exam  Constitutional:       General: She is not in acute distress.     Appearance: Normal appearance. She is not ill-appearing or toxic-appearing.   HENT:      Head: Normocephalic.      Mouth/Throat:      Lips: Pink.      Mouth: Mucous membranes are moist.      Pharynx: Oropharynx is clear.  Uvula midline.   Eyes:      General: Lids are normal.      Conjunctiva/sclera: Conjunctivae normal.   Cardiovascular:      Rate and Rhythm: Normal rate and regular rhythm.      Pulses:           Posterior tibial pulses are 2+ on the right side and 2+ on the left side.      Heart sounds: S1 normal and S2 normal. No murmur heard.    No friction rub. No gallop.   Pulmonary:      Effort: Pulmonary effort is normal.      Breath sounds: Normal breath sounds. No wheezing, rhonchi or rales.   Chest:      Chest wall: No tenderness.   Abdominal:      General: Abdomen is flat. Bowel sounds are normal.      Palpations: Abdomen is soft.      Tenderness: There is generalized abdominal tenderness. There is no right CVA tenderness, left CVA tenderness, guarding or rebound.   Musculoskeletal:        Back:       Right lower leg: No edema.      Left lower leg: No edema.      Comments: FROM of upper and lower extremities   Skin:     General: Skin is warm and dry.      Coloration: Skin is not pale.   Neurological:      Mental Status: She is alert and oriented to person, place, and time.      Gait: Gait is intact.   Psychiatric:         Speech: Speech normal.         Behavior: Behavior normal. Behavior is cooperative.         ED Course              ED Course as of 07/31/22 1742   Sun Jul 31, 2022   1528 Patient evaluated.   No diarrhea since yesterday x 1 episode.  RUQ abdominal pain/flank pain. Really no CVA tenderness on exam and there is more musculoskeletal tenderness. She does endorse increased pain with arms raised above head and stretching over to her left.    LLQ abdominal tenderness without guarding or rebound. H/o diverticulitis.     Differentials includes viral gastroenteritis, bacterial enteritis- no recent antibiotic use, diverticulitis, GERD, choledocholithiasis, renal calculi, pyelonephritis, others.     Plan for labs and imaging. She is in agreement with this.    Shaniqua Valles, CNP on 7/31/2022 at 3:30 PM         Procedures           Results for orders placed or performed during the hospital encounter of 07/31/22 (from the past 24 hour(s))   CBC with platelets differential    Narrative    The following orders were created for panel order CBC with platelets differential.  Procedure                               Abnormality         Status                     ---------                               -----------         ------                     CBC with platelets and d...[[id number]]                      Final result                 Please view results for these tests on the individual orders.   Comprehensive metabolic panel   Result Value Ref Range    Sodium 135 133 - 144 mmol/L    Potassium 3.8 3.4 - 5.3 mmol/L    Chloride 104 94 - 109 mmol/L    Carbon Dioxide (CO2) 26 20 - 32 mmol/L    Anion Gap 5 3 - 14 mmol/L    Urea Nitrogen 15 7 - 30 mg/dL    Creatinine 0.60 0.52 - 1.04 mg/dL    Calcium 9.2 8.5 - 10.1 mg/dL    Glucose 94 70 - 99 mg/dL    Alkaline Phosphatase 77 40 - 150 U/L    AST 14 0 - 45 U/L    ALT 18 0 - 50 U/L    Protein Total 7.4 6.8 - 8.8 g/dL    Albumin 3.6 3.4 - 5.0 g/dL    Bilirubin Total 0.5 0.2 - 1.3 mg/dL    GFR Estimate >90 >60 mL/min/1.73m2   Lipase   Result Value Ref Range    Lipase 179 73 - 393 U/L   CBC with platelets and differential   Result Value Ref Range    WBC Count 7.2 4.0 - 11.0 10e3/uL    RBC Count 4.73 3.80 - 5.20 10e6/uL    Hemoglobin 14.0 11.7 - 15.7 g/dL    Hematocrit 41.1 35.0 - 47.0 %    MCV 87 78 - 100 fL    MCH 29.6 26.5 - 33.0 pg    MCHC 34.1 31.5 - 36.5 g/dL    RDW 12.0 10.0 - 15.0 %    Platelet Count 276 150 - 450 10e3/uL    % Neutrophils 65 %    % Lymphocytes 26 %    % Monocytes 7 %    % Eosinophils 1 %    % Basophils 1 %    % Immature Granulocytes 0 %    NRBCs per 100 WBC 0 <1 /100    Absolute Neutrophils 4.7 1.6 - 8.3 10e3/uL    Absolute Lymphocytes 1.9 0.8 - 5.3 10e3/uL    Absolute Monocytes 0.5 0.0 - 1.3 10e3/uL    Absolute Eosinophils 0.1 0.0 - 0.7 10e3/uL    Absolute  Basophils 0.0 0.0 - 0.2 10e3/uL    Absolute Immature Granulocytes 0.0 <=0.4 10e3/uL    Absolute NRBCs 0.0 10e3/uL   CT Abdomen Pelvis w Contrast    Narrative    EXAMINATION: CT ABDOMEN PELVIS W CONTRAST, 7/31/2022 4:20 PM    TECHNIQUE:  Helical CT images from the lung bases through the  symphysis pubis were obtained  with IV contrast. Contrast dose: ISOVUE  370 94ml    COMPARISON: none    HISTORY: LLQ pain, right flank pain, h/o diverticulitis.    FINDINGS:    There is dependent atelectasis at the lung bases. There is a large  hiatal hernia.    There is a small low-density lesion in the left lobe of the liver most  likely a cyst. The liver is free of biliary ductal enlargement. The  gallbladder has been removed.    The the spleen and pancreas appear normal.    The adrenal glands are normal.    The right and left kidneys are free of masses or hydronephrosis. There  are no renal or ureteric calculi.    The periaortic lymph nodes are normal in caliber.    No intraperitoneal masses or inflammatory changes are noted.    In the pelvis the bladder and rectum appear normal.    Scoliosis and degenerative changes are present in the thoracic and  lumbar spine. Arthritic changes are seen in both hips worse on the  right than the left.      Impression    IMPRESSION: No intraperitoneal masses or inflammatory changes are  noted.    Large hiatal hernia.     ALEX XIONG MD         SYSTEM ID:  [id number]   UA reflex to Microscopic and Culture    Specimen: Urine, Midstream   Result Value Ref Range    Color Urine Straw Colorless, Straw, Light Yellow, Yellow    Appearance Urine Clear Clear    Glucose Urine Negative Negative mg/dL    Bilirubin Urine Negative Negative    Ketones Urine Trace (A) Negative mg/dL    Specific Gravity Urine 1.010 1.003 - 1.035    Blood Urine Negative Negative    pH Urine 5.5 4.7 - 8.0    Protein Albumin Urine Negative Negative mg/dL    Urobilinogen Urine Normal Normal, 2.0 mg/dL    Nitrite Urine Negative  Negative    Leukocyte Esterase Urine Moderate (A) Negative    Mucus Urine Present (A) None Seen /LPF    RBC Urine 1 <=2 /HPF    WBC Urine 8 (H) <=5 /HPF    Squamous Epithelials Urine 3 (H) <=1 /HPF    Narrative    Urine Culture ordered based on laboratory criteria       Medications   0.9% sodium chloride BOLUS (0 mLs Intravenous Stopped 7/31/22 1729)     Followed by   sodium chloride 0.9% infusion (has no administration in time range)   iopamidol (ISOVUE-370) solution 94 mL (94 mLs Intravenous Given 7/31/22 1609)   sodium chloride (PF) 0.9% PF flush 60 mL (50 mLs Intravenous Given 7/31/22 1608)   acetaminophen (TYLENOL) tablet 975 mg (975 mg Oral Given 7/31/22 1732)       Assessments & Plan (with Medical Decision Making)     I have reviewed the nursing notes.    I have reviewed the findings, diagnosis, plan and need for follow up with the patient.  (R10.84) Abdominal pain, generalized  (primary encounter diagnosis)  (R19.7) Diarrhea  Alert, pleasant, nontoxic 76-year old female presents ambulatory from home for evaluation of abdominal discomfort, diarrhea, and right side/flank discomfort. Diarrhea was on Friday, 1 episode yesterday and none today. No recent antibiotic use. No known exposures. She has no guarding or rebound tenderness. She really has no CVA tenderness but has some tenderness across lower right back/flank into anterolateral ribs. No gross deformity, rash, ecchymosis. Work-up unremarkable including labs and CT abdomen. Urine is contaminated and she does not have symptoms. We will await urine culture for determination on treatment. She is discharged home in stable condition with plan for symptomatic treatments, outpatient follow-up and reviewed return to ED precautions.    Shaniqua Valles CNP         New Prescriptions    No medications on file       Final diagnoses:   Abdominal pain, generalized   Diarrhea       7/31/2022   [facility]     Shaniqua Valles CNP  07/31/22 2422

## 2022-07-31 NOTE — DISCHARGE INSTRUCTIONS
"- Your work-up today in the ED including labs and imaging do not show any urgent or emergent concerns.   - You could have had a \"stomach bug\" that contributed to your symptoms of generalized abdominal discomfort and diarrhea.   - Ensure you are staying hydrated  - The pain on your right side seems musculoskeletal as it is tender to touch and increases with some stretching. You can use heat/ice/topical anesthetic such as lidocaine patch, acetaminophen (Tylenol) 1,000 mg three times daily.         RETURN TO THE ED WITH NEW OR WORSENING SYMPTOMS.    FOLLOW-UP WITH YOUR PRIMARY CARE PROVIDER IN 2-3 DAYS.      Shaniqua Valles CNP      Results for orders placed or performed during the hospital encounter of 07/31/22   CT Abdomen Pelvis w Contrast     Status: None    Narrative    EXAMINATION: CT ABDOMEN PELVIS W CONTRAST, 7/31/2022 4:20 PM    TECHNIQUE:  Helical CT images from the lung bases through the  symphysis pubis were obtained  with IV contrast. Contrast dose: ISOVUE  370 94ml    COMPARISON: none    HISTORY: LLQ pain, right flank pain, h/o diverticulitis.    FINDINGS:    There is dependent atelectasis at the lung bases. There is a large  hiatal hernia.    There is a small low-density lesion in the left lobe of the liver most  likely a cyst. The liver is free of biliary ductal enlargement. The  gallbladder has been removed.    The the spleen and pancreas appear normal.    The adrenal glands are normal.    The right and left kidneys are free of masses or hydronephrosis. There  are no renal or ureteric calculi.    The periaortic lymph nodes are normal in caliber.    No intraperitoneal masses or inflammatory changes are noted.    In the pelvis the bladder and rectum appear normal.    Scoliosis and degenerative changes are present in the thoracic and  lumbar spine. Arthritic changes are seen in both hips worse on the  right than the left.      Impression    IMPRESSION: No intraperitoneal masses or inflammatory changes " are  noted.    Large hiatal hernia.     ALEX XIONG MD         SYSTEM ID:  [id number]   Comprehensive metabolic panel     Status: Normal   Result Value Ref [place]    Sodium 135 133 - 144 mmol/L    Potassium 3.8 3.4 - 5.3 mmol/L    Chloride 104 94 - 109 mmol/L    Carbon Dioxide (CO2) 26 20 - 32 mmol/L    Anion Gap 5 3 - 14 mmol/L    Urea Nitrogen 15 7 - 30 mg/dL    Creatinine 0.60 0.52 - 1.04 mg/dL    Calcium 9.2 8.5 - 10.1 mg/dL    Glucose 94 70 - 99 mg/dL    Alkaline Phosphatase 77 40 - 150 U/L    AST 14 0 - 45 U/L    ALT 18 0 - 50 U/L    Protein Total 7.4 6.8 - 8.8 g/dL    Albumin 3.6 3.4 - 5.0 g/dL    Bilirubin Total 0.5 0.2 - 1.3 mg/dL    GFR Estimate >90 >60 mL/min/1.73m2   Lipase     Status: Normal   Result Value Ref [place]    Lipase 179 73 - 393 U/L   CBC with platelets and differential     Status: None   Result Value Ref [place]    WBC Count 7.2 4.0 - 11.0 10e3/uL    RBC Count 4.73 3.80 - 5.20 10e6/uL    Hemoglobin 14.0 11.7 - 15.7 g/dL    Hematocrit 41.1 35.0 - 47.0 %    MCV 87 78 - 100 fL    MCH 29.6 26.5 - 33.0 pg    MCHC 34.1 31.5 - 36.5 g/dL    RDW 12.0 10.0 - 15.0 %    Platelet Count 276 150 - 450 10e3/uL    % Neutrophils 65 %    % Lymphocytes 26 %    % Monocytes 7 %    % Eosinophils 1 %    % Basophils 1 %    % Immature Granulocytes 0 %    NRBCs per 100 WBC 0 <1 /100    Absolute Neutrophils 4.7 1.6 - 8.3 10e3/uL    Absolute Lymphocytes 1.9 0.8 - 5.3 10e3/uL    Absolute Monocytes 0.5 0.0 - 1.3 10e3/uL    Absolute Eosinophils 0.1 0.0 - 0.7 10e3/uL    Absolute Basophils 0.0 0.0 - 0.2 10e3/uL    Absolute Immature Granulocytes 0.0 <=0.4 10e3/uL    Absolute NRBCs 0.0 10e3/uL   CBC with platelets differential     Status: None    Narrative    The following orders were created for panel order CBC with platelets differential.  Procedure                               Abnormality         Status                     ---------                               -----------         ------                     CBC with  platelets and d...[[id number]]                      Final result                 Please view results for these tests on the individual orders.           What to expect when you have contrast    During your exam, we will inject  contrast  into your vein or artery. (Contrast is a clear liquid with iodine in it. It shows up on X-rays.)    You may feel warm or hot. You may have a metal taste in your mouth and a slight upset stomach. You may also feel pressure near the kidneys and bladder. These effects will last about 1 to 3 minutes.    Please tell us if you have:   Sneezing    Itching   Hives    Swelling in the face   A hoarse voice   Breathing problems   Other new symptoms    Serious problems are rare.  They may include:   Irregular heartbeat    Seizures   Kidney failure             Tissue damage   Shock     Death    If you have any problems during the exam, we  will treat them right away.    When you get home    Call your hospital if you have any new symptoms in the next 2 days, like hives or swelling. (Phone numbers are at the bottom of this page.) Or call your family doctor.     If you have wheezing or trouble breathing, call 911.    Self-care  -Drink at least 4 extra glasses of water today.   This reduces the stress on your kidneys.  -Keep taking your regular medicines.    The contrast will pass out of your body in your  Urine(pee). This will happen in the next 24 hours. You  will not feel this. Your urine will not  change color.    If you have kidney problems or take metformin    Drink 4 to 8 large glasses of water for the next  2 days, if you are not on a fluid restriction.    ?If you take metformin (Glucophage or Glucovance) for diabetes, keep taking it.      ?Your kidney function tests are abnormal.  If you take Metformin, do not take it for 48 hours. Please go to your clinic for a blood test within 3 days after your exam before the restarting this medicine.     (Note to provider:please give patient  prescription for lab tests.)    ?Special instructions: -    I have read and understand the above information.    Patient Sign Here:______________________________________Date:________Time:______    Staff Sign Here:________________________________________Date:_______Time:______      Radiology Departments:     ?[facility]: [phone] ?[place]: [phone]     ?[place]: [phone] ?[facility]:[phone]      ?[place]: [phone]  ?[place]: [phone]  ?[place]:[phone]    ?[facility] [place]:[phone]  ?[facility]:[phone]

## 2022-07-31 NOTE — ED TRIAGE NOTES
Diarrhea since Friday and abdominal pain. Hx of diverticulitis. Denies fever. Nausea without vomiting. Does not have gallbladder or appendix.

## 2022-07-31 NOTE — ED NOTES
Patient reports diarrhea since Friday. Yesterday 1 episode of diarrhea and none today. Patient denies any blood in stool. Patient denies any urinary symptoms. Patient reports fatigue and pain. Patient reports pain throughout stomach can not pinpoint exact spot of pain. Patient reports that abdominal pain radiates to back. Patient reports hiatal hernia,but this does not feel like her normal indigestion.

## 2022-08-02 LAB — BACTERIA UR CULT: NORMAL

## 2022-09-05 ENCOUNTER — HOSPITAL ENCOUNTER (EMERGENCY)
Facility: HOSPITAL | Age: 77
Discharge: HOME | End: 2022-09-05
Attending: NURSE PRACTITIONER | Admitting: NURSE PRACTITIONER
Payer: COMMERCIAL

## 2022-09-05 VITALS
DIASTOLIC BLOOD PRESSURE: 76 MMHG | SYSTOLIC BLOOD PRESSURE: 137 MMHG | HEART RATE: 70 BPM | TEMPERATURE: 97.7 F | OXYGEN SATURATION: 95 % | RESPIRATION RATE: 16 BRPM

## 2022-09-05 DIAGNOSIS — J01.90 ACUTE SINUSITIS, RECURRENCE NOT SPECIFIED, UNSPECIFIED LOCATION: ICD-10-CM

## 2022-09-05 DIAGNOSIS — J01.90 ACUTE SINUSITIS: Primary | ICD-10-CM

## 2022-09-05 DIAGNOSIS — R05.9 COUGH: ICD-10-CM

## 2022-09-05 PROCEDURE — G0463 HOSPITAL OUTPT CLINIC VISIT: HCPCS

## 2022-09-05 PROCEDURE — 99213 OFFICE O/P EST LOW 20 MIN: CPT | Performed by: NURSE PRACTITIONER

## 2022-09-05 ASSESSMENT — ENCOUNTER SYMPTOMS
COUGH: 1
CHILLS: 0
SINUS PRESSURE: 1
SINUS PAIN: 1
FEVER: 0
SHORTNESS OF BREATH: 0
APPETITE CHANGE: 0
TROUBLE SWALLOWING: 0
SORE THROAT: 0

## 2022-09-05 NOTE — ED TRIAGE NOTES
Pt presents with cough and congestion for a week. Pt states it has been getting worse. Pt denies fever.

## 2022-09-05 NOTE — DISCHARGE INSTRUCTIONS
Take antibiotic as prescribed.     Continue with sinus medications and cough syrup, tylenol or ibuprofen as needed at home.     Follow up with your doctor if no improvement in symptoms.    Return to emergency department for worsening or concerning symptoms.

## 2022-09-05 NOTE — ED TRIAGE NOTES
"    Pt states she has had a cough and runny nose since Tuesday. Does not think she has had fevers but did feel \"sweaty\" a couple of nights. States cough productive yellow secretions. States nasal secretions yellow. Denies and SOB. States she has COPD but feels her breathing is at baseline for her.  "

## 2022-09-06 NOTE — ED PROVIDER NOTES
History     Chief Complaint   Patient presents with     Cough     Nasal Congestion     HPI  Na Vogel is a 76 year old female with past medical history of COPD, who presents ambulatory to urgent care for evaluation of not feeling well.  She reports a sinus pain/pressure, postnasal drainage, nasal congestion and productive cough.  Patient notes that she is coughing up yellow phlegm and reports greenish nasal discharge. Symptoms started 1 week ago.  No shortness of breath, chest pain, nausea, vomiting or diarrhea.  Eating and drinking okay.  No known recent ill contacts.  Patient has been taking OTC sinus medication and cough syrup with minimal effectiveness.  She states her symptoms are progressively worsening.  She had COVID-19 virus earlier this year.  She declines COVID-19 testing at this time.  She states she would like to take a home COVID-19 test instead.    Allergies:  Allergies   Allergen Reactions     Aspirin Other (See Comments)     abd pain  cramps  Allergic to any type of aspirin      Avelox Hives     Ibuprofen Sodium Rash       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     Priority: Medium     Rosacea 08/29/2013     Priority: Medium     Hyperlipidemia LDL goal <100 07/25/2013     Priority: Medium     Acquired hypothyroidism      Priority: Medium     Gastroesophageal reflux disease      Priority: Medium        Past Medical History:    Past Medical History:   Diagnosis Date      Acute pharyngitis 02/24/2000     Acute sinusitis, unspecified 11/10/2000     Acute upper respiratory infections of unspecified 09/29/2005     Bronchitis, not specified as acute or chronic 01/07/2002     Cholecystitis, unspecified 03/06/2001     Dizziness and giddiness 10/29/2001     Dysuria 02/17/2005     Esophageal reflux 11/26/2002     Follow-up examination, following other surgery 08/01/2002     Follow-up examination, following unspecified surge 03/22/2001     Gastro-oesophageal reflux disease      Giant cell arteritis (H) 06/28/2000     Hiatal hernia      Midline low back pain without sciatica 8/29/2016     Mild persistent asthma 1/13/2014     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     Myalgia and myositis, unspecified 06/28//2000     Need for prophylactic vaccination and inoculation,Need for prophylactic vaccination and inoculation, 10/22/2004     Osteoarthrosis, unspecified whether generalized or 06/18/2002     Other abnormal findings on radiological examinatio 04/19/2002     Other and unspecified hyperlipidemia 01/23/2001     Other malaise and fatigue 11/12/2004     Other screening mammogram 11/27/2002     Other specified pre-operative examination 07/20/2005     Pain in joint involving lower leg 06/07/2002     Pain in joint, site unspecified 03/02/2000     Pure hypercholesterolemia 07/22/2003     Rosacea 8/29/2013     Routine general medical examination at [facility] 04/15/2002     Thyroid disease      Unspecified acquired hypothyroidism 11/22/2002     Unspecified hypertrophic and atrophic conditions o 11/09/1999     Unspecified otitis media 09/25/2007     Unspecified sinusitis (chronic) 11/04/1999       Past Surgical History:    Past Surgical History:   Procedure Laterality Date     APPENDECTOMY       CHOLECYSTECTOMY       COLONOSCOPY  07-    Repeat 10 years     COLONOSCOPY  2012     GYN SURGERY       LIGATE VEIN VARICOSE, PHLEBECTOMY MULTIPLE STAB, COMBINED  4/16/2014    Procedure: BILATERAL  ANTERIOR AND POSTERIOR STAB PHLEBECTOMY, EXCISION SOFT TISSUE MASS RIGHT LOWER BUTTOCK;  Surgeon: Lara Galeano DO;  Location: [facility] OR     ORTHOPEDIC SURGERY         Family History:    Family History   Problem Relation Age of Onset     Thyroid Disease Mother      Prostate Cancer Father      Cancer Father         cancer of bone       Social History:  Marital Status:   [5]  Social History     Tobacco Use     Smoking status: Never Smoker     Smokeless tobacco: Never Used   Substance Use Topics     Alcohol use: No     Drug use: No        Medications:    amoxicillin-clavulanate (AUGMENTIN) 875-125 MG tablet  albuterol (ALBUTEROL) 108 (90 BASE) MCG/ACT inhaler  budesonide-formoterol (SYMBICORT) 160-4.5 MCG/ACT inhaler  citalopram (CELEXA) 20 MG tablet  esomeprazole (NEXIUM) 40 MG CR capsule  ipratropium - albuterol 0.5 mg/2.5 mg/3 mL (DUONEB) 0.5-2.5 (3) MG/3ML nebulization  ORDER FOR DME  SYNTHROID 112 MCG tablet          Review of Systems   Constitutional: Negative for appetite change, chills and fever.   HENT: Positive for congestion, postnasal drip, sinus pressure and sinus pain. Negative for sore throat, tinnitus and trouble swallowing.    Respiratory: Positive for cough. Negative for shortness of breath.    Cardiovascular: Negative for chest pain.   All other systems reviewed and are negative.      Physical Exam   BP: 137/76  Pulse: 70  Temp: 97.7  F (36.5  C)  Resp: 16  SpO2: 95 %      Physical Exam  Vitals and nursing note reviewed.   Constitutional:       General: She is not in acute distress.     Appearance: Normal appearance. She is not diaphoretic.   HENT:      Head: Atraumatic.      Right Ear: Tympanic membrane and ear canal normal.      Left Ear: Tympanic membrane and ear canal normal.      Nose: Congestion present.      Right Sinus: Maxillary sinus tenderness and frontal sinus tenderness present.      Left Sinus: Frontal sinus tenderness present. No maxillary sinus tenderness.      Mouth/Throat:       Mouth: Mucous membranes are moist.      Pharynx: No oropharyngeal exudate.   Eyes:      Pupils: Pupils are equal, round, and reactive to light.   Cardiovascular:      Rate and Rhythm: Normal rate and regular rhythm.      Heart sounds: Normal heart sounds. No murmur heard.  Pulmonary:      Effort: Pulmonary effort is normal. No respiratory distress.      Breath sounds: Normal breath sounds.   Musculoskeletal:         General: No tenderness.      Cervical back: Neck supple.   Skin:     General: Skin is warm.      Capillary Refill: Capillary refill takes less than 2 seconds.      Findings: No rash.   Neurological:      Mental Status: She is alert and oriented to person, place, and time.         ED Course                 Procedures            No results found for this or any previous visit (from the past 24 hour(s)).    Medications - No data to display    Assessments & Plan (with Medical Decision Making)     I have reviewed the nursing notes.    76-year-old female that presented for evaluation of URI symptoms that started 1 week ago.  The patient mostly concerned about the sinus pressure pain, nasal drainage as well as a productive cough.  She denies any difficulty breathing.  Her respirations are nonlabored.  Her heart rate and rhythm are regular.  She does have bilateral frontal sinus tenderness to palpation as well and has right-sided maxillary sinus tenderness.  Her vital signs are within normal limits.    She declined COVID-19 testing today.  We will treat for acute sinusitis with Augmentin.  Recommended that she pushes fluids, continues taking OTC sinus medication.  She does have a nebulizer at home that she can use if she becomes short of breath.  Tylenol/ibuprofen as needed for pain.  Follow-up with primary medical provider if no improvement in symptoms.  Return to ED/UC for worsening concerning symptoms    I have reviewed the findings, diagnosis, plan and need for follow up with the patient.  This document  was prepared using a combination of typing and voice generated software.  While every attempt was made for accuracy, spelling and grammatical errors may exist.    New Prescriptions    AMOXICILLIN-CLAVULANATE (AUGMENTIN) 875-125 MG TABLET    Take 1 tablet by mouth 2 times daily for 7 days       Final diagnoses:   Acute sinusitis   Cough       9/5/2022   [facility]     Mpofu, Prudence, CNP  09/05/22 1941

## 2023-09-26 ENCOUNTER — APPOINTMENT (OUTPATIENT)
Dept: CT IMAGING | Facility: HOSPITAL | Age: 78
End: 2023-09-26
Attending: NURSE PRACTITIONER
Payer: COMMERCIAL

## 2023-09-26 ENCOUNTER — HOSPITAL ENCOUNTER (EMERGENCY)
Facility: HOSPITAL | Age: 78
Discharge: HOME | End: 2023-09-26
Attending: NURSE PRACTITIONER | Admitting: NURSE PRACTITIONER
Payer: COMMERCIAL

## 2023-09-26 VITALS
RESPIRATION RATE: 18 BRPM | TEMPERATURE: 97.1 F | HEART RATE: 75 BPM | DIASTOLIC BLOOD PRESSURE: 70 MMHG | OXYGEN SATURATION: 95 % | SYSTOLIC BLOOD PRESSURE: 136 MMHG

## 2023-09-26 DIAGNOSIS — J01.00 ACUTE MAXILLARY SINUSITIS: Primary | ICD-10-CM

## 2023-09-26 DIAGNOSIS — J01.00 ACUTE MAXILLARY SINUSITIS, RECURRENCE NOT SPECIFIED: ICD-10-CM

## 2023-09-26 DIAGNOSIS — R10.32 ABDOMINAL PAIN, LEFT LOWER QUADRANT: ICD-10-CM

## 2023-09-26 LAB
ANION GAP SERPL CALCULATED.3IONS-SCNC: 10 MMOL/L (ref 7–15)
BUN SERPL-MCNC: 17.5 MG/DL (ref 8–23)
CALCIUM SERPL-MCNC: 9.5 MG/DL (ref 8.8–10.2)
CHLORIDE SERPL-SCNC: 101 MMOL/L (ref 98–107)
CREAT SERPL-MCNC: 0.6 MG/DL (ref 0.51–0.95)
CRP SERPL-MCNC: <3 MG/L
DEPRECATED HCO3 PLAS-SCNC: 25 MMOL/L (ref 22–29)
EGFRCR SERPLBLD CKD-EPI 2021: >90 ML/MIN/1.73M2
ERYTHROCYTE [DISTWIDTH] IN BLOOD BY AUTOMATED COUNT: 12.2 % (ref 10–15)
GLUCOSE SERPL-MCNC: 94 MG/DL (ref 70–99)
HCT VFR BLD AUTO: 40.7 % (ref 35–47)
HGB BLD-MCNC: 13.6 G/DL (ref 11.7–15.7)
HOLD SPECIMEN: NORMAL
HOLD SPECIMEN: NORMAL
MCH RBC QN AUTO: 29.4 PG (ref 26.5–33)
MCHC RBC AUTO-ENTMCNC: 33.4 G/DL (ref 31.5–36.5)
MCV RBC AUTO: 88 FL (ref 78–100)
PLATELET # BLD AUTO: 304 10E3/UL (ref 150–450)
POTASSIUM SERPL-SCNC: 4.5 MMOL/L (ref 3.4–5.3)
RBC # BLD AUTO: 4.63 10E6/UL (ref 3.8–5.2)
SODIUM SERPL-SCNC: 136 MMOL/L (ref 135–145)
WBC # BLD AUTO: 7.9 10E3/UL (ref 4–11)

## 2023-09-26 PROCEDURE — G0463 HOSPITAL OUTPT CLINIC VISIT: HCPCS | Mod: 25

## 2023-09-26 PROCEDURE — 36415 COLL VENOUS BLD VENIPUNCTURE: CPT | Performed by: NURSE PRACTITIONER

## 2023-09-26 PROCEDURE — 74176 CT ABD & PELVIS W/O CONTRAST: CPT

## 2023-09-26 PROCEDURE — 80048 BASIC METABOLIC PNL TOTAL CA: CPT | Performed by: NURSE PRACTITIONER

## 2023-09-26 PROCEDURE — 85048 AUTOMATED LEUKOCYTE COUNT: CPT | Performed by: NURSE PRACTITIONER

## 2023-09-26 PROCEDURE — 86140 C-REACTIVE PROTEIN: CPT | Performed by: NURSE PRACTITIONER

## 2023-09-26 PROCEDURE — 99214 OFFICE O/P EST MOD 30 MIN: CPT | Performed by: NURSE PRACTITIONER

## 2023-09-26 ASSESSMENT — ENCOUNTER SYMPTOMS
MYALGIAS: 0
CHILLS: 0
COUGH: 1
NECK STIFFNESS: 0
VOMITING: 0
BLOOD IN STOOL: 0
DIARRHEA: 0
HEADACHES: 1
NAUSEA: 0
RHINORRHEA: 1
SHORTNESS OF BREATH: 0
ABDOMINAL DISTENTION: 0
FEVER: 0
PSYCHIATRIC NEGATIVE: 1
ABDOMINAL PAIN: 1
TROUBLE SWALLOWING: 0
NECK PAIN: 0
SORE THROAT: 1
HEMATURIA: 0

## 2023-09-26 ASSESSMENT — ACTIVITIES OF DAILY LIVING (ADL)
ADLS_ACUITY_SCORE: 35
ADLS_ACUITY_SCORE: 35

## 2023-09-26 NOTE — ED PROVIDER NOTES
History     Chief Complaint   Patient presents with    Cough    Abdominal Pain     HPI  Na Vogel is a 77 year old female who presents to urgent care today ambulatory with complaints of left lower quadrant pain which has been gradually worsening over the past few weeks.  History of diverticulitis.  Denies any BRBPR.  Patient also has ongoing sinusitis for the past 2 weeks.  Denies any fever, chills, nausea, vomiting, diarrhea, shortness of breath or chest pain.  Took Tylenol for pain.  No other concerns.     Allergies:  Allergies   Allergen Reactions    Aspirin Other (See Comments)     abd pain  cramps  Allergic to any type of aspirin     Moxifloxacin Hcl In Nacl Hives    Ibuprofen Sodium Rash       Problem List:    Patient Active Problem List    Diagnosis Date Noted    Midline low back pain without sciatica 08/29/2016     Priority: Medium    COPD (chronic obstructive pulmonary disease) (H) 03/21/2014     Priority: Medium    Mild persistent asthma 01/13/2014     Priority: Medium    Varicose veins of lower extremities with complications 11/11/2013     Priority: Medium    Venous (peripheral) insufficiency 11/11/2013     Priority: Medium    Symptomatic reticular veins 11/11/2013     Priority: Medium    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     Priority: Medium    Rosacea 08/29/2013     Priority: Medium    Hyperlipidemia LDL goal <100 07/25/2013     Priority: Medium    Acquired hypothyroidism      Priority: Medium    Gastroesophageal reflux disease      Priority: Medium        Past Medical History:    Past Medical History:   Diagnosis Date    Acute pharyngitis 02/24/2000    Acute sinusitis, unspecified 11/10/2000    Acute upper respiratory infections of unspecified 09/29/2005    Bronchitis, not specified as acute or chronic 01/07/2002    Cholecystitis, unspecified 03/06/2001    Dizziness and giddiness 10/29/2001    Dysuria 02/17/2005    Esophageal reflux 11/26/2002    Follow-up examination,  following other surgery 08/01/2002    Follow-up examination, following unspecified surge 03/22/2001    Gastro-oesophageal reflux disease     Giant cell arteritis (H) 06/28/2000    Hiatal hernia     Midline low back pain without sciatica 8/29/2016    Mild persistent asthma 1/13/2014    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    Myalgia and myositis, unspecified 06/28//2000    Need for prophylactic vaccination and inoculation,Need for prophylactic vaccination and inoculation, 10/22/2004    Osteoarthrosis, unspecified whether generalized or 06/18/2002    Other abnormal findings on radiological examinatio 04/19/2002    Other and unspecified hyperlipidemia 01/23/2001    Other malaise and fatigue 11/12/2004    Other screening mammogram 11/27/2002    Other specified pre-operative examination 07/20/2005    Pain in joint involving lower leg 06/07/2002    Pain in joint, site unspecified 03/02/2000    Pure hypercholesterolemia 07/22/2003    Rosacea 8/29/2013    Routine general medical examination at [facility] 04/15/2002    Thyroid disease     Unspecified acquired hypothyroidism 11/22/2002    Unspecified hypertrophic and atrophic conditions o 11/09/1999    Unspecified otitis media 09/25/2007    Unspecified sinusitis (chronic) 11/04/1999       Past Surgical History:    Past Surgical History:   Procedure Laterality Date    APPENDECTOMY      CHOLECYSTECTOMY      COLONOSCOPY  07-    Repeat 10 years    COLONOSCOPY  2012    GYN SURGERY      LIGATE VEIN VARICOSE, PHLEBECTOMY MULTIPLE STAB, COMBINED  4/16/2014    Procedure: BILATERAL ANTERIOR AND POSTERIOR STAB PHLEBECTOMY, EXCISION SOFT TISSUE MASS RIGHT LOWER BUTTOCK;  Surgeon: Lara Galeano DO;  Location: [facility]    ORTHOPEDIC SURGERY         Family History:    Family History   Problem Relation Age of Onset    Thyroid Disease Mother     Prostate Cancer Father     Cancer Father         cancer of bone       Social History:  Marital Status:   [5]  Social History      Tobacco Use    Smoking status: Never    Smokeless tobacco: Never   Substance Use Topics    Alcohol use: No    Drug use: No        Medications:    amoxicillin-clavulanate (AUGMENTIN) 875-125 MG tablet  albuterol (ALBUTEROL) 108 (90 BASE) MCG/ACT inhaler  budesonide-formoterol (SYMBICORT) 160-4.5 MCG/ACT inhaler  citalopram (CELEXA) 20 MG tablet  esomeprazole (NEXIUM) 40 MG CR capsule  ipratropium - albuterol 0.5 mg/2.5 mg/3 mL (DUONEB) 0.5-2.5 (3) MG/3ML nebulization  ORDER FOR DME  SYNTHROID 112 MCG tablet      Review of Systems   Constitutional:  Negative for chills and fever.   HENT:  Positive for congestion, rhinorrhea and sore throat. Negative for ear pain and trouble swallowing.    Respiratory:  Positive for cough. Negative for shortness of breath.    Cardiovascular:  Negative for chest pain.   Gastrointestinal:  Positive for abdominal pain. Negative for abdominal distention, blood in stool, diarrhea, nausea and vomiting.   Genitourinary:  Negative for decreased urine volume and hematuria.   Musculoskeletal:  Negative for gait problem, myalgias, neck pain and neck stiffness.   Skin:  Negative for rash.   Neurological:  Positive for headaches.   Psychiatric/Behavioral: Negative.       Physical Exam   BP: 136/70  Pulse: 75  Temp: 97.1  F (36.2  C)  Resp: 18  SpO2: 95 %    Physical Exam  Vitals and nursing note reviewed.   Constitutional:       General: She is not in acute distress.     Appearance: She is well-developed. She is not ill-appearing or toxic-appearing.   HENT:      Head: Normocephalic.      Jaw: There is normal jaw occlusion.      Right Ear: Tympanic membrane, ear canal and external ear normal.      Left Ear: Tympanic membrane, ear canal and external ear normal.      Nose: Congestion and rhinorrhea present.      Right Sinus: Maxillary sinus tenderness present. No frontal sinus tenderness.      Left Sinus: Maxillary sinus tenderness present. No frontal sinus tenderness.      Mouth/Throat:       Mouth: Mucous membranes are moist.      Pharynx: Oropharynx is clear. No oropharyngeal exudate or posterior oropharyngeal erythema.   Cardiovascular:      Rate and Rhythm: Normal rate and regular rhythm.      Pulses: Normal pulses.      Heart sounds: Normal heart sounds.   Pulmonary:      Effort: Pulmonary effort is normal.      Breath sounds: Normal breath sounds.   Abdominal:      General: Bowel sounds are normal. There is no distension.      Palpations: Abdomen is soft. There is no mass.      Tenderness: There is abdominal tenderness in the left lower quadrant. There is no guarding.   Musculoskeletal:      Cervical back: Normal range of motion and neck supple. No rigidity or tenderness.   Lymphadenopathy:      Cervical: No cervical adenopathy.   Skin:     General: Skin is warm and dry.   Neurological:      Mental Status: She is alert.   Psychiatric:         Mood and Affect: Mood normal.       ED Course     Results for orders placed or performed during the hospital encounter of 09/26/23 (from the past 24 hour(s))   CBC with platelets   Result Value Ref Range    WBC Count 7.9 4.0 - 11.0 10e3/uL    RBC Count 4.63 3.80 - 5.20 10e6/uL    Hemoglobin 13.6 11.7 - 15.7 g/dL    Hematocrit 40.7 35.0 - 47.0 %    MCV 88 78 - 100 fL    MCH 29.4 26.5 - 33.0 pg    MCHC 33.4 31.5 - 36.5 g/dL    RDW 12.2 10.0 - 15.0 %    Platelet Count 304 150 - 450 10e3/uL   CRP inflammation   Result Value Ref Range    CRP Inflammation <3.00 <5.00 mg/L   Basic metabolic panel   Result Value Ref Range    Sodium 136 135 - 145 mmol/L    Potassium 4.5 3.4 - 5.3 mmol/L    Chloride 101 98 - 107 mmol/L    Carbon Dioxide (CO2) 25 22 - 29 mmol/L    Anion Gap 10 7 - 15 mmol/L    Urea Nitrogen 17.5 8.0 - 23.0 mg/dL    Creatinine 0.60 0.51 - 0.95 mg/dL    GFR Estimate >90 >60 mL/min/1.73m2    Calcium 9.5 8.8 - 10.2 mg/dL    Glucose 94 70 - 99 mg/dL   Extra Tube    Narrative    The following orders were created for panel order Extra Tube.  Procedure                                Abnormality         Status                     ---------                               -----------         ------                     Extra Blue Top Tube[[id number]]                              Final result               Extra Red Top Tube[[id number]]                               Final result               Extra Green Top (Lithium...[[id number]]                                                   Please view results for these tests on the individual orders.   Extra Blue Top Tube   Result Value Ref Range    Hold Specimen JIC    Extra Red Top Tube   Result Value Ref Range    Hold Specimen JIC    CT Abdomen Pelvis w/o Contrast    Narrative    PROCEDURE:  CT ABDOMEN PELVIS W/O CONTRAST    HISTORY:  left lower abdominal pain, history of diverticulitis.    TECHNIQUE:  Helical CT of the abdomen and pelvis was performed without  intravenous contrast. This CT exam was performed using one or more the  following dose reduction techniques: automated exposure control,  adjustment of the mA and/or kV according to patient size, and/or  iterative reconstruction technique.    COMPARISON:  7/31/2022.    FINDINGS:      Evaluation of the solid organs is somewhat limited due to the lack of  intravenous contrast.    Limited views through the lung bases demonstrate tree-in-bud opacities  similar to prior. There is a large hiatal hernia.    Gallbladder is surgically absent. The liver, spleen, pancreas and  adrenal glands are unremarkable. There is no evidence of  hydronephrosis. The aorta is normal in size with scattered  atherosclerotic calcifications.      The bowel is normal in caliber. .    No suspicious osseous lesions are identified.      Impression    IMPRESSION:      No finding to correlate with acute left lower quadrant pain.    Question chronic ANGI infection at the lung bases.    Large hiatal hernia.    ANGELIC TOBIAS MD         SYSTEM ID:  [id number]       Medications - No data to display    Assessments &  Plan (with Medical Decision Making)     I have reviewed the nursing notes.    I have reviewed the findings, diagnosis, plan and need for follow up with the patient.  (J01.00) Acute maxillary sinusitis  (primary encounter diagnosis)  (R10.32) Abdominal pain, left lower quadrant  Plan:   Patient ambulatory with a nontoxic appearance.  Patient has maxillary sinus tenderness. declines any COVID, influenza or RSV testing today.  Symptoms have been ongoing for the past 2 weeks, will treat with Augmentin.  Patient also states she has been experiencing left lower quadrant pain which has been gradually worsening over the past 2 weeks.  Tenderness with palpation to left lower quadrant.   History of diverticulitis.  Denies any BRBPR.  Denies any vomiting or diarrhea.  Labs unremarkable.  CT abdomen pelvis without contrast completed which shows no findings to correlate with acute lower quadrant pain.  Large hiatal hernia.  Declines any further work-up at this time such as ct with contract, patient wants to monitor and follow-up as needed.  Patient to take Augmentin as ordered.  Alternate Tylenol and ibuprofen as needed for pain.  Push fluids to stay hydrated.  Follow-up with primary care provider or return to urgent care/ED with any worsening in condition or additional concerns.  Patient in agreement with treatment plan.    Discharge Medication List as of 9/26/2023  8:21 PM        START taking these medications    Details   amoxicillin-clavulanate (AUGMENTIN) 875-125 MG tablet Take 1 tablet by mouth 2 times daily for 7 days, Disp-20 tablet, R-0, [facility]           Final diagnoses:   Acute maxillary sinusitis   Abdominal pain, left lower quadrant     9/26/2023   [facility]       Rasheeda Suggs NP  09/27/23 [id number]

## 2023-09-26 NOTE — ED TRIAGE NOTES
Pt presents with c/o cough  Sweats, coughing fits , sore throat, headaches, congestion and drainage. Sinus pressure    Sx started for a few weeks, now getting worse   Has COPD    took tyl    No otc meds today

## 2023-09-27 NOTE — DISCHARGE INSTRUCTIONS
Augmentin as ordered  - Take entire course of antibiotic even if you start to feel better.  - Antibiotics can cause stomach upset including nausea and diarrhea. Read your bottle or ask the pharmacist if antibiotic can be taken with food to help prevent nausea. If you have symptoms of diarrhea you can take an over-the-counter probiotic and/or increase foods with probiotics such as yogurt, Mas[place], sauerkraut.    Follow-up with primary care provider or return to urgent care/ED with any worsening in condition or additional concerns.

## 2024-02-25 ENCOUNTER — APPOINTMENT (OUTPATIENT)
Dept: CT IMAGING | Facility: HOSPITAL | Age: 79
End: 2024-02-25
Attending: EMERGENCY MEDICINE
Payer: COMMERCIAL

## 2024-02-25 ENCOUNTER — HOSPITAL ENCOUNTER (EMERGENCY)
Facility: HOSPITAL | Age: 79
Discharge: HOME | End: 2024-02-25
Attending: EMERGENCY MEDICINE | Admitting: EMERGENCY MEDICINE
Payer: COMMERCIAL

## 2024-02-25 VITALS
BODY MASS INDEX: 26.65 KG/M2 | SYSTOLIC BLOOD PRESSURE: 172 MMHG | HEART RATE: 70 BPM | TEMPERATURE: 98.2 F | WEIGHT: 175.27 LBS | RESPIRATION RATE: 18 BRPM | OXYGEN SATURATION: 97 % | DIASTOLIC BLOOD PRESSURE: 81 MMHG

## 2024-02-25 DIAGNOSIS — R10.84 GENERALIZED ABDOMINAL PAIN: ICD-10-CM

## 2024-02-25 LAB
ALBUMIN SERPL BCG-MCNC: 4.2 G/DL (ref 3.5–5.2)
ALBUMIN UR-MCNC: NEGATIVE MG/DL
ALP SERPL-CCNC: 86 U/L (ref 40–150)
ALT SERPL W P-5'-P-CCNC: 14 U/L (ref 0–50)
ANION GAP SERPL CALCULATED.3IONS-SCNC: 11 MMOL/L (ref 7–15)
APPEARANCE UR: CLEAR
AST SERPL W P-5'-P-CCNC: 19 U/L (ref 0–45)
BASOPHILS # BLD AUTO: 0.1 10E3/UL (ref 0–0.2)
BASOPHILS NFR BLD AUTO: 1 %
BILIRUB SERPL-MCNC: 0.3 MG/DL
BILIRUB UR QL STRIP: NEGATIVE
BUN SERPL-MCNC: 20.5 MG/DL (ref 8–23)
CALCIUM SERPL-MCNC: 9.5 MG/DL (ref 8.8–10.2)
CHLORIDE SERPL-SCNC: 98 MMOL/L (ref 98–107)
COLOR UR AUTO: NORMAL
CREAT SERPL-MCNC: 0.74 MG/DL (ref 0.51–0.95)
DEPRECATED HCO3 PLAS-SCNC: 24 MMOL/L (ref 22–29)
EGFRCR SERPLBLD CKD-EPI 2021: 82 ML/MIN/1.73M2
EOSINOPHIL # BLD AUTO: 0.3 10E3/UL (ref 0–0.7)
EOSINOPHIL NFR BLD AUTO: 3 %
ERYTHROCYTE [DISTWIDTH] IN BLOOD BY AUTOMATED COUNT: 12.2 % (ref 10–15)
GLUCOSE SERPL-MCNC: 95 MG/DL (ref 70–99)
GLUCOSE UR STRIP-MCNC: NEGATIVE MG/DL
HCT VFR BLD AUTO: 40.3 % (ref 35–47)
HGB BLD-MCNC: 13.6 G/DL (ref 11.7–15.7)
HGB UR QL STRIP: NEGATIVE
HOLD SPECIMEN: NORMAL
HYALINE CASTS: 1 /LPF
IMM GRANULOCYTES # BLD: 0 10E3/UL
IMM GRANULOCYTES NFR BLD: 0 %
KETONES UR STRIP-MCNC: NEGATIVE MG/DL
LEUKOCYTE ESTERASE UR QL STRIP: NEGATIVE
LIPASE SERPL-CCNC: 42 U/L (ref 13–60)
LYMPHOCYTES # BLD AUTO: 2.7 10E3/UL (ref 0.8–5.3)
LYMPHOCYTES NFR BLD AUTO: 29 %
MCH RBC QN AUTO: 29.5 PG (ref 26.5–33)
MCHC RBC AUTO-ENTMCNC: 33.7 G/DL (ref 31.5–36.5)
MCV RBC AUTO: 87 FL (ref 78–100)
MONOCYTES # BLD AUTO: 0.7 10E3/UL (ref 0–1.3)
MONOCYTES NFR BLD AUTO: 7 %
NEUTROPHILS # BLD AUTO: 5.8 10E3/UL (ref 1.6–8.3)
NEUTROPHILS NFR BLD AUTO: 60 %
NITRATE UR QL: NEGATIVE
NRBC # BLD AUTO: 0 10E3/UL
NRBC BLD AUTO-RTO: 0 /100
PH UR STRIP: 5.5 [PH] (ref 4.7–8)
PLATELET # BLD AUTO: 315 10E3/UL (ref 150–450)
POTASSIUM SERPL-SCNC: 4.4 MMOL/L (ref 3.4–5.3)
PROT SERPL-MCNC: 7.3 G/DL (ref 6.4–8.3)
RBC # BLD AUTO: 4.61 10E6/UL (ref 3.8–5.2)
RBC URINE: 0 /HPF
SODIUM SERPL-SCNC: 133 MMOL/L (ref 135–145)
SP GR UR STRIP: 1.03 (ref 1–1.03)
SQUAMOUS EPITHELIAL: 0 /HPF
UROBILINOGEN UR STRIP-MCNC: NORMAL MG/DL
WBC # BLD AUTO: 9.5 10E3/UL (ref 4–11)
WBC URINE: 1 /HPF

## 2024-02-25 PROCEDURE — 99285 EMERGENCY DEPT VISIT HI MDM: CPT | Mod: 25

## 2024-02-25 PROCEDURE — 81001 URINALYSIS AUTO W/SCOPE: CPT | Performed by: EMERGENCY MEDICINE

## 2024-02-25 PROCEDURE — 250N000011 HC RX IP 250 OP 636: Performed by: EMERGENCY MEDICINE

## 2024-02-25 PROCEDURE — 85025 COMPLETE CBC W/AUTO DIFF WBC: CPT | Performed by: EMERGENCY MEDICINE

## 2024-02-25 PROCEDURE — 96361 HYDRATE IV INFUSION ADD-ON: CPT

## 2024-02-25 PROCEDURE — 83690 ASSAY OF LIPASE: CPT | Performed by: EMERGENCY MEDICINE

## 2024-02-25 PROCEDURE — 99284 EMERGENCY DEPT VISIT MOD MDM: CPT | Performed by: STUDENT IN AN ORGANIZED HEALTH CARE EDUCATION/TRAINING PROGRAM

## 2024-02-25 PROCEDURE — 74177 CT ABD & PELVIS W/CONTRAST: CPT

## 2024-02-25 PROCEDURE — 36415 COLL VENOUS BLD VENIPUNCTURE: CPT | Performed by: EMERGENCY MEDICINE

## 2024-02-25 PROCEDURE — 258N000003 HC RX IP 258 OP 636: Performed by: EMERGENCY MEDICINE

## 2024-02-25 PROCEDURE — 96374 THER/PROPH/DIAG INJ IV PUSH: CPT | Mod: XU

## 2024-02-25 PROCEDURE — 80053 COMPREHEN METABOLIC PANEL: CPT | Performed by: EMERGENCY MEDICINE

## 2024-02-25 RX ORDER — MORPHINE SULFATE 4 MG/ML
4 INJECTION, SOLUTION INTRAMUSCULAR; INTRAVENOUS ONCE
Status: COMPLETED | OUTPATIENT
Start: 2024-02-25 | End: 2024-02-25

## 2024-02-25 RX ORDER — IOPAMIDOL 755 MG/ML
85 INJECTION, SOLUTION INTRAVASCULAR ONCE
Status: COMPLETED | OUTPATIENT
Start: 2024-02-25 | End: 2024-02-25

## 2024-02-25 RX ADMIN — SODIUM CHLORIDE 1000 ML: 9 INJECTION, SOLUTION INTRAVENOUS at 19:00

## 2024-02-25 RX ADMIN — IOPAMIDOL 85 ML: 755 INJECTION, SOLUTION INTRAVENOUS at 18:48

## 2024-02-25 RX ADMIN — MORPHINE SULFATE 4 MG: 4 INJECTION, SOLUTION INTRAMUSCULAR; INTRAVENOUS at 19:01

## 2024-02-25 ASSESSMENT — COLUMBIA-SUICIDE SEVERITY RATING SCALE - C-SSRS
6. HAVE YOU EVER DONE ANYTHING, STARTED TO DO ANYTHING, OR PREPARED TO DO ANYTHING TO END YOUR LIFE?: NO
1. IN THE PAST MONTH, HAVE YOU WISHED YOU WERE DEAD OR WISHED YOU COULD GO TO SLEEP AND NOT WAKE UP?: NO
2. HAVE YOU ACTUALLY HAD ANY THOUGHTS OF KILLING YOURSELF IN THE PAST MONTH?: NO

## 2024-02-25 ASSESSMENT — ACTIVITIES OF DAILY LIVING (ADL)
ADLS_ACUITY_SCORE: 35

## 2024-02-25 NOTE — ED TRIAGE NOTES
Pt presents with abdominal pains and low back pain since Friday. Pt reports pain is worsening. Pt reports hx of diverticulitis. Pt reports hx of cholecystectomy and appendectomy. Pt denies NVD. Pt had diarrhea 2 days ago and nothing since.

## 2024-02-26 NOTE — ED PROVIDER NOTES
EMERGENCY DEPARTMENT ENCOUNTER      NAME: Na Vogel  AGE: 78 year old female  YOB: 1945  MRN: [id number]  EVALUATION DATE & TIME: 2024  5:54 PM    PCP: Roxana Ferreira    ED PROVIDER: Avinash Porter M.D.      Chief Complaint   Patient presents with    Abdominal Pain         FINAL IMPRESSION:  1. Generalized abdominal pain          ED COURSE & MEDICAL DECISION MAKIN year old female presents to the Emergency Department for evaluation of abdominal pain.  Patient is vitally stable and nontoxic-appearing when she arrives to the emergency department.  She reports pain in her entire abdomen.  She does have a history of diverticulitis.  Exam overall is reassuring with some mild tenderness to deep palpation but no localizing guarding or peritoneal signs.  Lab and imaging evaluation were initiated as below including abdominal CT to rule out any inflammatory process or diverticulitis complication.  Labs so far look stable including normal white blood cell count. CT pending at shift change. Patient will be signed out to oncoming ED provider Dr. Goodman.    ED Course as of 24   Sun 2024   1900 SOR 78F w/ hx of diverticulitis, s/p appy and chevy, 2-3d of abdominal pain pending CT. If neg, home. Greatest concern for diverticulitis    CT reassuring without acute pathology. Appropriate for further outpatient management, discharged stable condition all question answered and return precautions given         MEDICATIONS GIVEN IN THE EMERGENCY:  Medications   iopamidol (ISOVUE-370) solution 85 mL (85 mLs Intravenous $Given 24)   sodium chloride (PF) 0.9% PF flush 50 mL (50 mLs Intravenous $Given 24)   sodium chloride 0.9% BOLUS 1,000 mL (1,000 mLs Intravenous $New Bag 24)   morphine (PF) injection 4 mg (4 mg Intravenous $Given 24)       NEW PRESCRIPTIONS STARTED AT TODAY'S ER VISIT  New Prescriptions    No medications on file           =================================================================    HPI    Patient information was obtained from: Patient      Na Vogel is a 78 year old female who presents to this ED today for evaluation of abdominal pain.  Patient reports abdominal pain for the last 3 days, described as cramping, currently 7 out of 10 in severity.  It radiates across her entire abdomen without any particular area being the worst.  She reports having a few loose stools on Friday around the time that the pain started, currently is not having diarrhea.  Denies nausea or vomiting.  Reports feeling chilled at 1 point but has not measured any fevers.  Denies dysuria or hematuria.  Does have a history of diverticulitis.  Is also status postcholecystectomy and appendectomy.      REVIEW OF SYSTEMS   All systems reviewed and negative except as noted in HPI.    PAST MEDICAL HISTORY:  Past Medical History:   Diagnosis Date    Acute pharyngitis 02/24/2000    Acute sinusitis, unspecified 11/10/2000    Acute upper respiratory infections of unspecified 09/29/2005    Bronchitis, not specified as acute or chronic 01/07/2002    Cholecystitis, unspecified 03/06/2001    Dizziness and giddiness 10/29/2001    Dysuria 02/17/2005    Esophageal reflux 11/26/2002    Follow-up examination, following other surgery 08/01/2002    Follow-up examination, following unspecified surge 03/22/2001    Gastro-oesophageal reflux disease     Giant cell arteritis (H) 06/28/2000    Hiatal hernia     Midline low back pain without sciatica 8/29/2016    Mild persistent asthma 1/13/2014    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    Myalgia and myositis, unspecified 06/28//2000    Need for prophylactic vaccination and inoculation,Need for prophylactic vaccination and inoculation, 10/22/2004    Osteoarthrosis, unspecified whether generalized or 06/18/2002    Other abnormal findings on radiological examinatio 04/19/2002    Other and unspecified  hyperlipidemia 01/23/2001    Other malaise and fatigue 11/12/2004    Other screening mammogram 11/27/2002    Other specified pre-operative examination 07/20/2005    Pain in joint involving lower leg 06/07/2002    Pain in joint, site unspecified 03/02/2000    Pure hypercholesterolemia 07/22/2003    Rosacea 8/29/2013    Routine general medical examination at [facility] 04/15/2002    Thyroid disease     Unspecified acquired hypothyroidism 11/22/2002    Unspecified hypertrophic and atrophic conditions o 11/09/1999    Unspecified otitis media 09/25/2007    Unspecified sinusitis (chronic) 11/04/1999       PAST SURGICAL HISTORY:  Past Surgical History:   Procedure Laterality Date    APPENDECTOMY      CHOLECYSTECTOMY      COLONOSCOPY  07-    Repeat 10 years    COLONOSCOPY  2012    GYN SURGERY      LIGATE VEIN VARICOSE, PHLEBECTOMY MULTIPLE STAB, COMBINED  4/16/2014    Procedure: BILATERAL ANTERIOR AND POSTERIOR STAB PHLEBECTOMY, EXCISION SOFT TISSUE MASS RIGHT LOWER BUTTOCK;  Surgeon: Lara Galeano DO;  Location: [facility]    ORTHOPEDIC SURGERY             CURRENT MEDICATIONS:    No current facility-administered medications for this encounter.     Current Outpatient Medications   Medication    citalopram (CELEXA) 20 MG tablet    esomeprazole (NEXIUM) 40 MG CR capsule    SYNTHROID 112 MCG tablet    ORDER FOR DME         ALLERGIES:  Allergies   Allergen Reactions    Aspirin Other (See Comments)     abd pain  cramps  Allergic to any type of aspirin     Moxifloxacin Hcl In Nacl Hives    Ibuprofen Sodium Rash       FAMILY HISTORY:  Family History   Problem Relation Age of Onset    Thyroid Disease Mother     Prostate Cancer Father     Cancer Father         cancer of bone       SOCIAL HISTORY:   Social History     Socioeconomic History    Marital status:    Tobacco Use    Smoking status: Never    Smokeless tobacco: Never   Substance and Sexual Activity    Alcohol use: No    Drug use: No   Other Topics Concern     Parent/sibling w/ CABG, MI or angioplasty before 65F 55M? No       VITALS:  /86   Pulse 72   Temp 97.7  F (36.5  C) (Tympanic)   Resp 20   Wt 79.5 kg (175 lb 4.3 oz)   SpO2 93%   BMI 26.65 kg/m      PHYSICAL EXAM    Constitutional: Well developed, Well nourished, NAD.  HENT: Normocephalic, Atraumatic. Neck Supple.  Eyes: EOMI, Conjunctiva normal.  Respiratory: Breathing comfortably on room air. Speaks full sentences easily. Lungs clear to ascultation.  Cardiovascular: Normal heart rate, Regular rhythm. No peripheral edema.  Abdomen: Soft, mild tenderness in bilateral lower quadrants without any peritoneal signs or guarding.  Musculoskeletal: Good range of motion in all major joints. No major deformities noted.  Integument: Warm, Dry.  Neurologic: Alert & awake, Normal motor function, Normal sensory function, No focal deficits noted.   Psychiatric: Cooperative. Affect appropriate.     LAB:  All pertinent labs reviewed and interpreted.  Labs Ordered and Resulted from Time of ED Arrival to Time of ED Departure   COMPREHENSIVE METABOLIC PANEL - Abnormal       Result Value    Sodium 133 (*)     Potassium 4.4      Carbon Dioxide (CO2) 24      Anion Gap 11      Urea Nitrogen 20.5      Creatinine 0.74      GFR Estimate 82      Calcium 9.5      Chloride 98      Glucose 95      Alkaline Phosphatase 86      AST 19      ALT 14      Protein Total 7.3      Albumin 4.2      Bilirubin Total 0.3     LIPASE - Normal    Lipase 42     UA MACROSCOPIC WITH REFLEX TO MICRO AND CULTURE - Normal    Color Urine Straw      Appearance Urine Clear      Glucose Urine Negative      Bilirubin Urine Negative      Ketones Urine Negative      Specific Gravity Urine 1.028      Blood Urine Negative      pH Urine 5.5      Protein Albumin Urine Negative      Urobilinogen Urine Normal      Nitrite Urine Negative      Leukocyte Esterase Urine Negative      RBC Urine 0      WBC Urine 1      Squamous Epithelials Urine 0      Hyaline Casts Urine 1      CBC WITH PLATELETS AND DIFFERENTIAL    WBC Count 9.5      RBC Count 4.61      Hemoglobin 13.6      Hematocrit 40.3      MCV 87      MCH 29.5      MCHC 33.7      RDW 12.2      Platelet Count 315      % Neutrophils 60      % Lymphocytes 29      % Monocytes 7      % Eosinophils 3      % Basophils 1      % Immature Granulocytes 0      NRBCs per 100 WBC 0      Absolute Neutrophils 5.8      Absolute Lymphocytes 2.7      Absolute Monocytes 0.7      Absolute Eosinophils 0.3      Absolute Basophils 0.1      Absolute Immature Granulocytes 0.0      Absolute NRBCs 0.0         RADIOLOGY:  Reviewed all pertinent imaging. Please see official radiology report.  CT Abdomen Pelvis w Contrast   Final Result   IMPRESSION:   No acute findings in the abdomen or pelvis.      There is a 6 mm nodule in the right lung base. Follow-up CT of the   chest in 6-12 months is recommended.      Large hiatal hernia containing essentially the entire stomach.      HERMINIO DIA MD            SYSTEM ID:  [id number]            Avinash Porter M.D.  Emergency Medicine  [facility]  [address] [place] [id number] [phone]  Dept: [phone]       Avinash Porter MD  02/25/24 1840       Isaías Goodman MD  02/25/24 2002

## 2024-02-26 NOTE — DISCHARGE INSTRUCTIONS
Return to the emergency department for worsening symptoms or new concerning symptoms.  Follow-up with your primary care provider within the next week for ongoing symptoms.  Call to schedule appointment.  You can use over-the-counter medications as needed for symptom management.